# Patient Record
Sex: MALE | Race: WHITE | NOT HISPANIC OR LATINO | Employment: OTHER | URBAN - METROPOLITAN AREA
[De-identification: names, ages, dates, MRNs, and addresses within clinical notes are randomized per-mention and may not be internally consistent; named-entity substitution may affect disease eponyms.]

---

## 2017-03-06 ENCOUNTER — HOSPITAL ENCOUNTER (OUTPATIENT)
Dept: ULTRASOUND IMAGING | Facility: CLINIC | Age: 72
End: 2017-03-06
Attending: RADIOLOGY
Payer: MEDICARE

## 2017-03-06 ENCOUNTER — HOSPITAL ENCOUNTER (OUTPATIENT)
Dept: ULTRASOUND IMAGING | Facility: CLINIC | Age: 72
Discharge: HOME | End: 2017-03-06
Attending: RADIOLOGY | Admitting: RADIOLOGY
Payer: MEDICARE

## 2017-03-06 ENCOUNTER — OFFICE VISIT (OUTPATIENT)
Dept: OTHER | Facility: CLINIC | Age: 72
End: 2017-03-06
Attending: RADIOLOGY
Payer: MEDICARE

## 2017-03-06 VITALS — HEART RATE: 78 BPM | SYSTOLIC BLOOD PRESSURE: 150 MMHG | DIASTOLIC BLOOD PRESSURE: 83 MMHG

## 2017-03-06 DIAGNOSIS — I70.201 ATHEROSCLEROSIS OF RIGHT LEG (H): ICD-10-CM

## 2017-03-06 DIAGNOSIS — I73.9 PAD (PERIPHERAL ARTERY DISEASE) (H): Primary | ICD-10-CM

## 2017-03-06 PROCEDURE — 99211 OFF/OP EST MAY X REQ PHY/QHP: CPT

## 2017-03-06 PROCEDURE — 93924 LWR XTR VASC STDY BILAT: CPT

## 2017-03-06 PROCEDURE — 93926 LOWER EXTREMITY STUDY: CPT | Mod: RT

## 2017-03-06 NOTE — MR AVS SNAPSHOT
"              After Visit Summary   3/6/2017    Alberto Dorsey    MRN: [id number]           Patient Information     Date Of Birth          1945        Visit Information        Provider Department      3/6/2017 3:30 PM Manuel Mondragon MD [facility]        Today's Diagnoses     PAD (peripheral artery disease) (H)    -  1      Care Instructions    Follow up in 6 mo with repeat imaging.  Notify us sooner if worsening in symptoms or concerns.  [facility] will call to schedule        Follow-ups after your visit        Follow-up notes from your care team     Return in about 6 months (around 9/6/2017).      Who to contact     If you have questions or need follow up information about today's clinic visit or your schedule please contact [facility] directly at [phone].  Normal or non-critical lab and imaging results will be communicated to you by MyChart, letter or phone within 4 business days after the clinic has received the results. If you do not hear from us within 7 days, please contact the clinic through MyChart or phone. If you have a critical or abnormal lab result, we will notify you by phone as soon as possible.  Submit refill requests through [employer] or call your pharmacy and they will forward the refill request to us. Please allow 3 business days for your refill to be completed.          Additional Information About Your Visit        MyChart Information     [employer] lets you send messages to your doctor, view your test results, renew your prescriptions, schedule appointments and more. To sign up, go to [email or web address] . Click on \"Log in\" on the left side of the screen, which will take you to the Welcome page. Then click on \"Sign up Now\" on the right side of the page.     You will be asked to enter the access code listed below, as well as some personal information. Please follow the directions to create your username and password.     Your access code " is: [email or web address]  Expires: 2017  2:20 PM     Your access code will  in 90 days. If you need help or a new code, please call your [place] clinic or [phone].        Care EveryWhere ID     This is your Care EveryWhere ID. This could be used by other organizations to access your [place] medical records  [id number]        Your Vitals Were     Pulse                   78            Blood Pressure from Last 3 Encounters:   17 150/83   10/28/15 140/80   10/12/15 141/73    Weight from Last 3 Encounters:   16 170 lb (77.1 kg)   10/28/15 187 lb 12.8 oz (85.2 kg)               Primary Care Provider Office Phone # Fax #    Nancy Shipley [phone]       [facility]  [address]  [facility] [address]        Thank you!     Thank you for choosing [facility] [place]  for your care. Our goal is always to provide you with excellent care. Hearing back from our patients is one way we can continue to improve our services. Please take a few minutes to complete the written survey that you may receive in the mail after your visit with us. Thank you!             Your Updated Medication List - Protect others around you: Learn how to safely use, store and throw away your medicines at www.disposemymeds.org.          This list is accurate as of: 3/6/17 11:59 PM.  Always use your most recent med list.                   Brand Name Dispense Instructions for use    AMLODIPINE BESYLATE PO      Take 10 mg by mouth daily       aspirin 81 MG tablet      Take 81 mg by mouth daily       HYDRALAZINE HCL PO      Take 25 mg by mouth 2 times daily       lisinopril-hydrochlorothiazide 20-25 MG per tablet    PRINZIDE/ZESTORETIC     Take 1 tablet by mouth daily       triamcinolone 0.1 % cream    KENALOG     Apply topically 2 times daily

## 2017-03-06 NOTE — NURSING NOTE
"Chief Complaint   Patient presents with     RECHECK     R pop & TONYA w/ exercise (2:15 V[facility]; 3:30 FS) History of right NATALYA angioplasty with stenting & right popliteal artery angioplasty; 7/27/2007 follow up to 10/12/2015 appointment with Dr Mondragon        Initial /83 (BP Location: Right arm, Patient Position: Chair, Cuff Size: Adult Regular)  Pulse 78 Estimated body mass index is 22.43 kg/(m^2) as calculated from the following:    Height as of 3/23/16: 6' 1\" (1.854 m).    Weight as of 3/23/16: 170 lb (77.1 kg).  Medication Reconciliation: complete     Face to face nursing time: 8 minutes    Georgette Vickers MA     "

## 2017-03-07 NOTE — PROGRESS NOTES
March 6, 2017         Nancy Shipley MD   [facility]   [address]   [place] [address]   [facility]       Dear Dr. Shipley:      I saw Alberto Dorsey at the [facility] today.  He is a 71-year-old man with lower extremity claudication symptoms.  He underwent a right common iliac artery stenting and right popliteal artery angioplasty in 2007 with Dr. Mackey.  Since then, the patient has done well.  He denies any significant residual claudication symptoms.  He is able to walk without any problems and perform his activities of daily living without problems.  The patient continues to smoke a few cigarettes a day.  He notices an occasional, what he describes as a twitch in the right calf over the course of the last 1 year.      Repeat noninvasive imaging studies were performed.      The right common iliac artery stent is patent without significant stenosis.  The right popliteal artery is also patent without significant stenosis.      Repeat resting and exercise ABIs were performed.  The resting and exercise ABIs on the right are 0.96 and 0.76 respectively versus 0.93 and 0.91 on the prior exam.      The resting and exercise ABIs on the left are 1.00 and 0.74 respectively versus 0.92 and 0.88 on the prior exam.      PHYSICAL EXAMINATION:  The patient's feet do not show signs of acute ischemia.  There are no ulcerations.  Foot pulses are palpable bilaterally.      IMPRESSION AND PLAN:  Lower extremity claudication symptoms status post right common iliac artery stenting and right popliteal artery angioplasty in 2007.  Doing well.  Post-exercise ABIs on today's exam on today's studies are slightly lower than noted on the previous exam.  However, ultrasound of the stents and right popliteal artery is unremarkable.  We will obtain followup ultrasounds in and repeat ABIs in 6 months to monitor for any developing stenoses.      Thank you for  allowing me to participate in the management of this patient.      Total time spent discussing cares was 10 minutes.      Sincerely,         JEFFERY ULLOA MD             D: 2017 16:00   T: 2017 07:08   MT: [email or web address]      Name:     DINO LIMA   MRN:      [id number]        Account:      [id number]   :      1945           Visit Date:   2017      Document: [id number]       cc: Nancy Shipley MD

## 2017-03-08 NOTE — PATIENT INSTRUCTIONS
Follow up in 6 mo with repeat imaging.  Notify us sooner if worsening in symptoms or concerns.  [facility] will call to schedule

## 2018-04-16 ENCOUNTER — HOSPITAL ENCOUNTER (OUTPATIENT)
Dept: ULTRASOUND IMAGING | Facility: CLINIC | Age: 73
End: 2018-04-16
Attending: RADIOLOGY
Payer: COMMERCIAL

## 2018-04-16 ENCOUNTER — OFFICE VISIT (OUTPATIENT)
Dept: OTHER | Facility: CLINIC | Age: 73
End: 2018-04-16
Attending: RADIOLOGY
Payer: COMMERCIAL

## 2018-04-16 ENCOUNTER — HOSPITAL ENCOUNTER (OUTPATIENT)
Dept: ULTRASOUND IMAGING | Facility: CLINIC | Age: 73
Discharge: HOME | End: 2018-04-16
Attending: RADIOLOGY | Admitting: RADIOLOGY
Payer: COMMERCIAL

## 2018-04-16 VITALS — HEART RATE: 80 BPM | SYSTOLIC BLOOD PRESSURE: 136 MMHG | DIASTOLIC BLOOD PRESSURE: 68 MMHG

## 2018-04-16 DIAGNOSIS — I73.9 PAD (PERIPHERAL ARTERY DISEASE) (H): Primary | ICD-10-CM

## 2018-04-16 DIAGNOSIS — I73.9 PAD (PERIPHERAL ARTERY DISEASE) (H): ICD-10-CM

## 2018-04-16 PROCEDURE — 93924 LWR XTR VASC STDY BILAT: CPT

## 2018-04-16 PROCEDURE — 93926 LOWER EXTREMITY STUDY: CPT | Mod: RT

## 2018-04-16 NOTE — PROGRESS NOTES
Dear Dr. Shipley:       I saw Alberto Dorsey at the [facility] today.  He is a 72-year-old man with lower extremity claudication symptoms.  He underwent a right common iliac artery stenting and right popliteal artery angioplasty in 2007 with Dr. Mackey. Since then, the patient has done well.  He denies any significant residual claudication symptoms.  He is able to walk without any problems and perform his activities of daily living without problems.  The patient continues to smoke 4-5  few cigarettes a day.  He has noticed some increased sensitivity in the toes of the right foot for the past 11 years. He has some pain attributable to a cut toenail in the left foot.        Repeat noninvasive imaging studies were performed.       The right common iliac artery stent is patent with a mild stenosis at mid aspect. The right popliteal artery is also patent without significant stenosis.       Repeat resting and exercise ABIs were performed.  The resting and exercise ABIs on the right are 1.10 and 0.81 vs 0.96 and 0.76  on the prior exam.       The resting and exercise ABIs on the left are 0.92 and 0.66 vs 1.00 and 0.74  on the prior exam.       PHYSICAL EXAMINATION:  The patient's feet do not show signs of acute ischemia.  There are no ulcerations.  Foot pulses are palpable bilaterally. VSS.      IMPRESSION AND PLAN:  Lower extremity claudication symptoms status post right common iliac artery stenting and right popliteal artery angioplasty in 2007.  Doing well. Post-exercise ABIs on today's exam are show moderate arterial insufficiency which is stable.  We will obtain followup ultrasounds in and repeat ABIs in 1 year monitor for any developing stenoses.       Thank you for allowing me to participate in the management of this patient.       Total time spent discussing cares was 10 minutes.

## 2018-04-16 NOTE — PATIENT INSTRUCTIONS
[facility]    1) Please follow up in a year for an Ultrasound/TONYA of your legs and clinic visit with Dr Mondragon.     2) You will get a call or letter from the clinic to help you schedule this appointment.     Please call Eusebia Mondragon's nurse with any concerns or questions at

## 2018-04-16 NOTE — NURSING NOTE
"No chief complaint on file.      Initial /68 (BP Location: Left arm, Patient Position: Chair, Cuff Size: Adult Regular)  Pulse 80 Estimated body mass index is 22.43 kg/(m^2) as calculated from the following:    Height as of 3/23/16: 6' 1\" (1.854 m).    Weight as of 3/23/16: 170 lb (77.1 kg).  Medication Reconciliation: sofia Lockett CMA on 4/16/2018 at 1:33 PM    "

## 2018-08-01 DIAGNOSIS — I73.9 PAD (PERIPHERAL ARTERY DISEASE) (H): Primary | ICD-10-CM

## 2018-10-02 ENCOUNTER — TELEPHONE (OUTPATIENT)
Dept: OTHER | Facility: CLINIC | Age: 73
End: 2018-10-02

## 2018-10-02 NOTE — TELEPHONE ENCOUNTER
Patient called and states he has bilateral calf( right greater than left )claudication for approximately 1 month.  No rest pain.  He is on ASA only.  Smoker.   H/o History right NATALYA PTA with stenting and right pop artery  angioplasty done on 7/27/2017 comparison study 3/6/2017. Six month  followup with Dr. Mondragon. PAD (peripheral artery disease) (H)    Offered an appointment for this Thursday at [facility] speciality clinic. Patient declined.   Will obtain TONYA with exercise and duplex with the right NATALYA stent and right popliteal artery in the near future.     Will route to IR  to contact Alberto today or tomorrow and schedule exams.  Will review with radiologist and call patient with results.    Eusebia Alves RN  IR nurse clinician  [phone]

## 2018-10-04 NOTE — TELEPHONE ENCOUNTER
Alberto is scheduled at [facility] 10/5/18 2:10pm for BLE art US and TONYA w/ex.  Returning to Eusebia Alves to review results. .celina

## 2018-10-05 ENCOUNTER — HOSPITAL ENCOUNTER (OUTPATIENT)
Dept: ULTRASOUND IMAGING | Facility: CLINIC | Age: 73
Discharge: HOME | End: 2018-10-05
Attending: RADIOLOGY | Admitting: RADIOLOGY
Payer: COMMERCIAL

## 2018-10-05 ENCOUNTER — HOSPITAL ENCOUNTER (OUTPATIENT)
Dept: ULTRASOUND IMAGING | Facility: CLINIC | Age: 73
End: 2018-10-05
Attending: RADIOLOGY
Payer: COMMERCIAL

## 2018-10-05 DIAGNOSIS — I70.213 ATHEROSCLER OF NATIVE ARTERY OF BOTH LEGS WITH INTERMIT CLAUDICATION (H): ICD-10-CM

## 2018-10-05 DIAGNOSIS — I73.9 PAD (PERIPHERAL ARTERY DISEASE) (H): ICD-10-CM

## 2018-10-05 PROCEDURE — 93926 LOWER EXTREMITY STUDY: CPT | Mod: RT

## 2018-10-05 PROCEDURE — 93924 LWR XTR VASC STDY BILAT: CPT

## 2018-10-08 ENCOUNTER — TELEPHONE (OUTPATIENT)
Dept: OTHER | Facility: CLINIC | Age: 73
End: 2018-10-08

## 2018-10-08 NOTE — TELEPHONE ENCOUNTER
Spoke with Alberto.  Discussed results of the ultrasound done on 10/5.  Symptoms of bilateral leg claudication right greater than left is unlikely due to arterial disease. Slight decrease in left indices but not enough to cause the symptoms that Alberto is concerned about.  Etiology unknown? Recommend follow up with primary physician.  Recommend follow up in 6 months.Instructed to contact us sooner if questions or concerns.   Eusebia Alves RN  IR nurse clinician  [phone]  RIGHT LOWER EXTREMITY ARTERIAL DUPLEX ULTRASOUND  10/5/2018  3:13 PM     HISTORY: 73-year-old patient with bilateral calf pain and tightness  while walking. Patient has tingling in the calf, as well.     COMPARISON: April 16, 2018     TECHNIQUE: Color Doppler and spectral waveform analysis obtained in  the right common iliac artery and popliteal artery.     FINDINGS: The right common iliac artery and stent are patent.  Borderline velocity elevation measuring up to 220 cm/second in the  outflow segment. The popliteal artery is patent without velocity  elevation. Diffuse atherosclerotic plaque, though no visible stenosis.  Triphasic waveforms and popliteal and tibial peroneal trunk segments.         IMPRESSION: Patent right common iliac artery and popliteal artery  without evidence of occlusion or stenosis.     LAURIE MTZ MD  ULTRASOUND ANKLE-BRACHIAL INDEX DOPPLER WITH EXERCISE BILATERAL  10/5/2018 3:25 PM     HISTORY:  73-year-old patient with bilateral calf pain while walking.  Numbness and tingling in both calves. Patient had a right common iliac  arterial stent and right popliteal angioplasty on July 27, 2007.     COMPARISON: April 16, 2018.     FINDINGS: Resting TONYA in the right lower extremity 0.98, previously  1.1. Resting TONYA in the left lower extremity 0.74, previously 0.92.  Distal waveforms in the right lower extremity are triphasic and  biphasic in the left lower extremity. Patient exercised on a treadmill  for 5 minutes at 10%  incline and 1.5 miles per hour. Patient noted  bilateral calf tightness. Post exercise TONYA values are 0.96 on the  right, previously 0.81; 0.62 on the left, previously 0.66.         IMPRESSION:  1. Normal TONYA examination in the right lower extremity.  2. Mild arterial insufficiency in the left lower extremity, slightly  worsened since previous exam.

## 2018-11-13 DIAGNOSIS — I73.9 PAD (PERIPHERAL ARTERY DISEASE) (H): Primary | ICD-10-CM

## 2019-06-04 ENCOUNTER — HOSPITAL ENCOUNTER (OUTPATIENT)
Dept: ULTRASOUND IMAGING | Facility: CLINIC | Age: 74
Discharge: HOME | End: 2019-06-04
Attending: RADIOLOGY | Admitting: RADIOLOGY
Payer: COMMERCIAL

## 2019-06-04 ENCOUNTER — HOSPITAL ENCOUNTER (OUTPATIENT)
Dept: ULTRASOUND IMAGING | Facility: CLINIC | Age: 74
End: 2019-06-04
Attending: RADIOLOGY
Payer: COMMERCIAL

## 2019-06-04 DIAGNOSIS — I73.9 PAD (PERIPHERAL ARTERY DISEASE) (H): ICD-10-CM

## 2019-06-04 PROCEDURE — 93924 LWR XTR VASC STDY BILAT: CPT

## 2019-06-04 PROCEDURE — 93926 LOWER EXTREMITY STUDY: CPT | Mod: RT

## 2019-06-06 ENCOUNTER — TELEPHONE (OUTPATIENT)
Dept: OTHER | Facility: CLINIC | Age: 74
End: 2019-06-06

## 2019-06-06 NOTE — TELEPHONE ENCOUNTER
Spoke with patient, he is had left leg claudication for a few months.  Reviewed TONYA's recommend consult with Dr. Almaraz on Monday.  Patient has seen Dr. Mondragon in the past.  Is fine seeing Dr. Almaraz on Monday.   Patient agreed to plan.  Eusebia Alves RN  IR nurse clinician  [phone]  ULTRASOUND TONYA DOPPLER WITH EXERCISE BILATERAL June 4, 2019 10:45 AM      HISTORY: History right NATALYA PTA with stenting and right pop artery. Six  month followup. PAD (peripheral artery disease) (H).     COMPARISON: 10/15/2018. 10/8/2018.     FINDINGS:  Right TONYA: 0.88, previously 0.98.  Left TONYA: 0.51, previously 0.74.     Waveforms: Triphasic on the right. Monophasic on the left.     Exercise: Patient exercised on a treadmill for 5 minutes at 1.5 miles  prior at a 10% incline. Patient complained of bilateral calf  tightening at 2 minutes and 30 seconds and left calf increasing  tightness at 3 minutes and 15 seconds.     Right exercise TONYA: 0.91, previously 0.96.  Left exercise TONYA: 0.14, previously 0.62.                                                                      IMPRESSION:   1. Right TONYA shows moderate arterial insufficiency which becomes mild  with exercise. Resting and exercise ABIs are worse when compared to  the prior study.  2. Left TONYA shows moderate arterial insufficiency which becomes  critical with exercise. Resting and exercise ABIs are worse when  compared to the prior study.     TONYA CRITERIA:  >0.95 Normal  0.90 - 0.94 Mild  0.5 - 0.89 Moderate  0.2 - 0.49 Severe  <0.2 Critical     MICHELLE ALMARAZ DO

## 2019-06-10 ENCOUNTER — OFFICE VISIT (OUTPATIENT)
Dept: OTHER | Facility: CLINIC | Age: 74
End: 2019-06-10
Attending: RADIOLOGY
Payer: COMMERCIAL

## 2019-06-10 VITALS — HEART RATE: 80 BPM | SYSTOLIC BLOOD PRESSURE: 135 MMHG | DIASTOLIC BLOOD PRESSURE: 67 MMHG

## 2019-06-10 DIAGNOSIS — R09.89 BILATERAL CAROTID BRUITS: Primary | ICD-10-CM

## 2019-06-10 DIAGNOSIS — I73.9 PAD (PERIPHERAL ARTERY DISEASE) (H): Primary | ICD-10-CM

## 2019-06-10 DIAGNOSIS — I70.213 ATHEROSCLEROSIS OF NATIVE ARTERIES OF EXTREMITIES WITH INTERMITTENT CLAUDICATION, BILATERAL LEGS (H): ICD-10-CM

## 2019-06-10 NOTE — NURSING NOTE
"Alberto Dorsey is a 73 year old male who presents for:  Chief Complaint   Patient presents with     Consult     claudication, left leg, TONYA's done        Vitals:    Vitals:    06/10/19 1405 06/10/19 1406   BP: 144/60 135/67   BP Location: Left arm Right arm   Patient Position: Chair Chair   Cuff Size: Adult Regular Adult Regular   Pulse: 83 80       BMI:  Estimated body mass index is 22.43 kg/m  as calculated from the following:    Height as of 3/23/16: 6' 1\" (1.854 m).    Weight as of 3/23/16: 170 lb (77.1 kg).    Pain Score:  Data Unavailable        Kecia Perez MA     "

## 2019-06-11 NOTE — PATIENT INSTRUCTIONS
Schedule pre-op for upcoming angiogram  Schedule bilateral leg angiogram  Schedule bilateral carotid

## 2019-06-11 NOTE — PROGRESS NOTES
Patient Name: Alberto Dorsey  Patient MRN: [id number]  Patient : 1945    HPI: This is a 73 year old male who presents today for bilateral leg claudication left worse than right.  He does have a history of right common iliac artery stent and right popliteal artery angioplasty done in  with Dr. Mackey.  He has had ongoing symptoms since  in both legs.  However, they have been atypical in its presentation for arterial insufficiency.   Back in 2018, he complained of calf pain and burning sensation both legs that actually improved when he lied down.  His TONYA's at that time were normal on the right with mild arterial insufficiency in the left.  Due to his ongoing issues, I recommended that we repeat the study in 6 months. But in the meantime follow up with his primary.      He states, it has progressively has worsened.  He is also noted cyanosis in his left great toe recently.  It appears that the left toe is also showing signs of cyanosis as well.  He is claudicating at half a block with calf pain into the ankle.  Left is definitely worse than the right.  He denies rest pain.  He still continues to smoke.     OBJECTIVE:   /67 (BP Location: Right arm, Patient Position: Chair, Cuff Size: Adult Regular)   Pulse 80   +bilateral carotid bruit.  Right greater than left.     General Appearance: WDWN male in NAD  Lower Extremity: No swelling.  Cyanosis noted in both great toes.  Left greater than right.  Pulses:  Absent left femoral pulse  2+ right femoral pulse  Absent left dorsal pedis  Doppler posterior tibial  Doppler right dorsal pedis  Doppler Posterior tibial      Imaging:   Results for orders placed or performed during the hospital encounter of 19   US TONYA Doppler with Exercise    Narrative    ULTRASOUND TONYA DOPPLER WITH EXERCISE BILATERAL 2019 10:45 AM     HISTORY: History right NATALYA PTA with stenting and right pop artery. Six  month followup. PAD (peripheral artery disease)  (H).    COMPARISON: 10/15/2018. 10/8/2018.    FINDINGS:  Right TONYA: 0.88, previously 0.98.  Left TONYA: 0.51, previously 0.74.    Waveforms: Triphasic on the right. Monophasic on the left.    Exercise: Patient exercised on a treadmill for 5 minutes at 1.5 miles  prior at a 10% incline. Patient complained of bilateral calf  tightening at 2 minutes and 30 seconds and left calf increasing  tightness at 3 minutes and 15 seconds.    Right exercise TONYA: 0.91, previously 0.96.  Left exercise TONYA: 0.14, previously 0.62.      Impression    IMPRESSION:   1. Right TONYA shows moderate arterial insufficiency which becomes mild  with exercise. Resting and exercise ABIs are worse when compared to  the prior study.  2. Left TONYA shows moderate arterial insufficiency which becomes  critical with exercise. Resting and exercise ABIs are worse when  compared to the prior study.    TONYA CRITERIA:  >0.95 Normal  0.90 - 0.94 Mild  0.5 - 0.89 Moderate  0.2 - 0.49 Severe  <0.2 Critical    MICHELLE RODRIGUEZ,          Assessment/Plan:    This is a pleasant 73 year old male with bilateral life limiting claudication with left being worse than the right.  He recently has noticed cyanosis in the left toe, and it appears that it may be starting in the right toe as well.  He is claudicating at around half a block and at this point it is quite limiting.  He is using a walker to ambulate.     We discussed his TONYA and right arterial duplex that was performed on 6/4/2019.  His right common iliac stent and popliteal artery Is patent.  However, there are elevated velocities within the stent. This is unchanged from previous study.  His TONYA's study shows moderate arterial insufficiency which becomes critical with exercise in his left leg.  The right TONYA show moderate arterial insufficiency which becomes mild with exercise.  Both resting and exercise ABIs are worse when compared to the prior study.    Recommendation:  I recommended to the patient that I  perform a bilateral leg angiogram with emphasis on the left leg first.  If intervention needs to be done we may have to bring him back a second time to study the right leg.  I also recommended that if have a bilateral carotid ultrasound as well before we do his leg angiogram.    He is agreed to the plan and would like it performed as soon as possible.         I met with the patient for 30 minutes of which >50% of the time was used to discuss treatment options and/or coordination of care.    Thank you for the consult. We will continue to monitor this patient for their vascular needs.    Dr. Marcelle Almaraz DO  Pager: [phone]  Interventional Radiology   [facility]  [phone]

## 2019-06-12 ENCOUNTER — HOSPITAL ENCOUNTER (OUTPATIENT)
Dept: ULTRASOUND IMAGING | Facility: CLINIC | Age: 74
Discharge: HOME | End: 2019-06-12
Attending: RADIOLOGY | Admitting: RADIOLOGY
Payer: COMMERCIAL

## 2019-06-12 ENCOUNTER — TELEPHONE (OUTPATIENT)
Dept: OTHER | Facility: CLINIC | Age: 74
End: 2019-06-12

## 2019-06-12 DIAGNOSIS — R09.89 BILATERAL CAROTID BRUITS: ICD-10-CM

## 2019-06-12 PROCEDURE — 93880 EXTRACRANIAL BILAT STUDY: CPT

## 2019-06-12 NOTE — TELEPHONE ENCOUNTER
Called patient with results.  He is having his pre-op on Friday.  Recommend annual f/u carotid ultrasound.  Eusebia Alves RN  IR nurse clinician  [phone]  US CAROTID BILATERAL 6/12/2019 9:29 AM     HISTORY: Bilateral carotid bruits     COMPARISON: None     FINDINGS:      Right side:   On the grayscale images, mixed plaque is identified at the carotid  bifurcation extending into the internal and external carotid arteries.  Spectral waveform analysis was performed. Peak systolic and diastolic  velocities in the right internal carotid artery are 109 and 15 cm/s.  Per sonographic criteria, degree of stenosis in the right internal  carotid artery is less than 50%.  Flow in the right vertebral artery is not adequately visualized.     Left side:   On the grayscale images, mixed plaque is identified at the carotid  bifurcation extending into the internal and external carotid arteries.  Spectral waveform analysis was performed. Peak systolic and diastolic   velocities in the left internal carotid artery are 131 and 24 cm/s.  Per sonographic criteria, degree of stenosis in the left internal  carotid artery is 50-69%.  Flow in the left vertebral artery is antegrade.                                                                       IMPRESSION: Per sonographic criteria, there is a less than 50%  stenosis in the right internal carotid artery and a 50-69% stenosis in  the left internal carotid artery. Unable to adequately visualize flow  in the right vertebral artery.     Degree of stenosis measured relative to the diameter of the normal  internal carotid artery per NASCET or NASCET type criteria     JEFFERY ULLOA MD

## 2019-06-12 NOTE — TELEPHONE ENCOUNTER
Carotid ultrasound with negative for stenosis.  Discussed with Dr. Macedo regarding carotid bruit.  I am concerned that it may be cardiovascular related.  Last ECHO was in 2010.    We will likely proceed with angiogram next week.  Patient has pre-op on Friday.    When patient returns for 1 mo f/u with Dr. Almaraz, I will recommend to the patient to see Dr. Macedo as well.  Current smoker.  Eusebia Alves RN  IR nurse clinician  [phone]

## 2019-06-19 ENCOUNTER — APPOINTMENT (OUTPATIENT)
Dept: INTERVENTIONAL RADIOLOGY/VASCULAR | Facility: CLINIC | Age: 74
End: 2019-06-19
Attending: RADIOLOGY
Payer: COMMERCIAL

## 2019-06-19 ENCOUNTER — HOSPITAL ENCOUNTER (OUTPATIENT)
Facility: CLINIC | Age: 74
Discharge: HOME | End: 2019-06-19
Attending: RADIOLOGY | Admitting: RADIOLOGY
Payer: COMMERCIAL

## 2019-06-19 VITALS
TEMPERATURE: 96.2 F | RESPIRATION RATE: 18 BRPM | OXYGEN SATURATION: 95 % | SYSTOLIC BLOOD PRESSURE: 138 MMHG | HEART RATE: 82 BPM | HEIGHT: 73 IN | BODY MASS INDEX: 22.66 KG/M2 | WEIGHT: 171 LBS | DIASTOLIC BLOOD PRESSURE: 73 MMHG

## 2019-06-19 DIAGNOSIS — I70.212 ATHEROSCLEROSIS OF NATIVE ARTERY OF LEFT LOWER EXTREMITY WITH INTERMITTENT CLAUDICATION (H): ICD-10-CM

## 2019-06-19 DIAGNOSIS — Z72.0 TOBACCO ABUSE: ICD-10-CM

## 2019-06-19 DIAGNOSIS — E78.5 HYPERLIPIDEMIA LDL GOAL <70: Primary | ICD-10-CM

## 2019-06-19 DIAGNOSIS — I70.213 ATHEROSCLEROSIS OF NATIVE ARTERIES OF EXTREMITIES WITH INTERMITTENT CLAUDICATION, BILATERAL LEGS (H): ICD-10-CM

## 2019-06-19 LAB
APTT PPP: 30 SEC (ref 22–37)
CHOLEST SERPL-MCNC: 154 MG/DL
CREAT SERPL-MCNC: 0.96 MG/DL (ref 0.66–1.25)
ERYTHROCYTE [DISTWIDTH] IN BLOOD BY AUTOMATED COUNT: 13.2 % (ref 10–15)
GFR SERPL CREATININE-BSD FRML MDRD: 79 ML/MIN/{1.73_M2}
HBA1C MFR BLD: 5.4 % (ref 0–5.6)
HCT VFR BLD AUTO: 34.9 % (ref 40–53)
HDLC SERPL-MCNC: 46 MG/DL
HGB BLD-MCNC: 12.6 G/DL (ref 13.3–17.7)
INR PPP: 0.9 (ref 0.86–1.14)
LDLC SERPL CALC-MCNC: 58 MG/DL
MCH RBC QN AUTO: 32.9 PG (ref 26.5–33)
MCHC RBC AUTO-ENTMCNC: 36.1 G/DL (ref 31.5–36.5)
MCV RBC AUTO: 91 FL (ref 78–100)
NONHDLC SERPL-MCNC: 108 MG/DL
PLATELET # BLD AUTO: 288 10E9/L (ref 150–450)
RBC # BLD AUTO: 3.83 10E12/L (ref 4.4–5.9)
TRIGL SERPL-MCNC: 250 MG/DL
WBC # BLD AUTO: 6.8 10E9/L (ref 4–11)

## 2019-06-19 PROCEDURE — 85027 COMPLETE CBC AUTOMATED: CPT | Performed by: RADIOLOGY

## 2019-06-19 PROCEDURE — 27210805 ZZH SHEATH CR4

## 2019-06-19 PROCEDURE — 37221 ZZHC REVASC ILIAC ART, ANGIO/STENT, INIT VESSEL: CPT

## 2019-06-19 PROCEDURE — 83036 HEMOGLOBIN GLYCOSYLATED A1C: CPT | Performed by: RADIOLOGY

## 2019-06-19 PROCEDURE — 27210894 ZZH CATH CR6

## 2019-06-19 PROCEDURE — 36415 COLL VENOUS BLD VENIPUNCTURE: CPT

## 2019-06-19 PROCEDURE — 27210742 ZZH CATH CR1

## 2019-06-19 PROCEDURE — 25000132 ZZH RX MED GY IP 250 OP 250 PS 637: Performed by: NURSE PRACTITIONER

## 2019-06-19 PROCEDURE — 85730 THROMBOPLASTIN TIME PARTIAL: CPT | Performed by: RADIOLOGY

## 2019-06-19 PROCEDURE — 85610 PROTHROMBIN TIME: CPT | Performed by: RADIOLOGY

## 2019-06-19 PROCEDURE — 27210906 ZZH KIT CR8

## 2019-06-19 PROCEDURE — 25000128 H RX IP 250 OP 636

## 2019-06-19 PROCEDURE — C1769 GUIDE WIRE: HCPCS

## 2019-06-19 PROCEDURE — 80061 LIPID PANEL: CPT | Performed by: RADIOLOGY

## 2019-06-19 PROCEDURE — 25500064 ZZH RX 255 OP 636: Performed by: RADIOLOGY

## 2019-06-19 PROCEDURE — 25000125 ZZHC RX 250

## 2019-06-19 PROCEDURE — 36140 INTRO NDL ICATH UPR/LXTR ART: CPT | Mod: XU

## 2019-06-19 PROCEDURE — 27210804 ZZH SHEATH CR3

## 2019-06-19 PROCEDURE — 82565 ASSAY OF CREATININE: CPT | Performed by: RADIOLOGY

## 2019-06-19 PROCEDURE — 40000853 ZZH STATISTIC ANGIOGRAM, STENT, VERTEBRO PLASTY

## 2019-06-19 PROCEDURE — 25800030 ZZH RX IP 258 OP 636: Performed by: RADIOLOGY

## 2019-06-19 PROCEDURE — 27210845 ZZH DEVICE INFLATION CR5

## 2019-06-19 PROCEDURE — 25000128 H RX IP 250 OP 636: Performed by: RADIOLOGY

## 2019-06-19 PROCEDURE — 99204 OFFICE O/P NEW MOD 45 MIN: CPT | Performed by: INTERNAL MEDICINE

## 2019-06-19 PROCEDURE — C1876 STENT, NON-COA/NON-COV W/DEL: HCPCS

## 2019-06-19 PROCEDURE — 27210886 ZZH ACCESSORY CR5

## 2019-06-19 RX ORDER — FLUMAZENIL 0.1 MG/ML
0.2 INJECTION, SOLUTION INTRAVENOUS
Status: DISCONTINUED | OUTPATIENT
Start: 2019-06-19 | End: 2019-06-19

## 2019-06-19 RX ORDER — DEXTROSE MONOHYDRATE 25 G/50ML
25-50 INJECTION, SOLUTION INTRAVENOUS
Status: DISCONTINUED | OUTPATIENT
Start: 2019-06-19 | End: 2019-06-19

## 2019-06-19 RX ORDER — LIDOCAINE HYDROCHLORIDE 10 MG/ML
INJECTION, SOLUTION INFILTRATION; PERINEURAL
Status: COMPLETED
Start: 2019-06-19 | End: 2019-06-19

## 2019-06-19 RX ORDER — FENTANYL CITRATE 50 UG/ML
INJECTION, SOLUTION INTRAMUSCULAR; INTRAVENOUS
Status: COMPLETED
Start: 2019-06-19 | End: 2019-06-19

## 2019-06-19 RX ORDER — ATORVASTATIN CALCIUM 40 MG/1
40 TABLET, FILM COATED ORAL AT BEDTIME
Qty: 90 TABLET | Refills: 3 | Status: ON HOLD | OUTPATIENT
Start: 2019-06-19 | End: 2019-11-06

## 2019-06-19 RX ORDER — SODIUM CHLORIDE 9 MG/ML
INJECTION, SOLUTION INTRAVENOUS CONTINUOUS
Status: DISCONTINUED | OUTPATIENT
Start: 2019-06-19 | End: 2019-06-20 | Stop reason: HOSPADM

## 2019-06-19 RX ORDER — HEPARIN SODIUM 1000 [USP'U]/ML
500-6000 INJECTION, SOLUTION INTRAVENOUS; SUBCUTANEOUS
Status: DISCONTINUED | OUTPATIENT
Start: 2019-06-19 | End: 2019-06-19

## 2019-06-19 RX ORDER — FENTANYL CITRATE 50 UG/ML
25-50 INJECTION, SOLUTION INTRAMUSCULAR; INTRAVENOUS EVERY 5 MIN PRN
Status: DISCONTINUED | OUTPATIENT
Start: 2019-06-19 | End: 2019-06-19

## 2019-06-19 RX ORDER — HEPARIN SODIUM 200 [USP'U]/100ML
500 INJECTION, SOLUTION INTRAVENOUS CONTINUOUS PRN
Status: DISCONTINUED | OUTPATIENT
Start: 2019-06-19 | End: 2019-06-19

## 2019-06-19 RX ORDER — LIDOCAINE 40 MG/G
CREAM TOPICAL
Status: DISCONTINUED | OUTPATIENT
Start: 2019-06-19 | End: 2019-06-19

## 2019-06-19 RX ORDER — NALOXONE HYDROCHLORIDE 0.4 MG/ML
.1-.4 INJECTION, SOLUTION INTRAMUSCULAR; INTRAVENOUS; SUBCUTANEOUS
Status: DISCONTINUED | OUTPATIENT
Start: 2019-06-19 | End: 2019-06-19

## 2019-06-19 RX ORDER — NICOTINE POLACRILEX 4 MG
15-30 LOZENGE BUCCAL
Status: DISCONTINUED | OUTPATIENT
Start: 2019-06-19 | End: 2019-06-19

## 2019-06-19 RX ORDER — IODIXANOL 320 MG/ML
150 INJECTION, SOLUTION INTRAVASCULAR ONCE
Status: COMPLETED | OUTPATIENT
Start: 2019-06-19 | End: 2019-06-19

## 2019-06-19 RX ORDER — SODIUM CHLORIDE 9 MG/ML
INJECTION, SOLUTION INTRAVENOUS CONTINUOUS
Status: DISCONTINUED | OUTPATIENT
Start: 2019-06-19 | End: 2019-06-19

## 2019-06-19 RX ORDER — ACETAMINOPHEN 500 MG
500 TABLET ORAL EVERY 6 HOURS PRN
Status: DISCONTINUED | OUTPATIENT
Start: 2019-06-19 | End: 2019-06-20 | Stop reason: HOSPADM

## 2019-06-19 RX ORDER — BUPROPION HYDROCHLORIDE 150 MG/1
150 TABLET, FILM COATED, EXTENDED RELEASE ORAL 2 TIMES DAILY
Qty: 60 TABLET | Refills: 3 | Status: ON HOLD | OUTPATIENT
Start: 2019-06-19 | End: 2019-11-06

## 2019-06-19 RX ADMIN — MIDAZOLAM HYDROCHLORIDE 0.5 MG: 1 INJECTION, SOLUTION INTRAMUSCULAR; INTRAVENOUS at 14:55

## 2019-06-19 RX ADMIN — MIDAZOLAM HYDROCHLORIDE 0.5 MG: 1 INJECTION, SOLUTION INTRAMUSCULAR; INTRAVENOUS at 15:02

## 2019-06-19 RX ADMIN — HEPARIN SODIUM 10000 UNITS: 10000 INJECTION INTRAVENOUS; SUBCUTANEOUS at 14:47

## 2019-06-19 RX ADMIN — MIDAZOLAM HYDROCHLORIDE 0.5 MG: 1 INJECTION, SOLUTION INTRAMUSCULAR; INTRAVENOUS at 15:43

## 2019-06-19 RX ADMIN — FENTANYL CITRATE 25 MCG: 50 INJECTION INTRAMUSCULAR; INTRAVENOUS at 15:11

## 2019-06-19 RX ADMIN — MIDAZOLAM HYDROCHLORIDE 0.5 MG: 1 INJECTION, SOLUTION INTRAMUSCULAR; INTRAVENOUS at 16:07

## 2019-06-19 RX ADMIN — FENTANYL CITRATE 25 MCG: 50 INJECTION INTRAMUSCULAR; INTRAVENOUS at 15:03

## 2019-06-19 RX ADMIN — FENTANYL CITRATE 25 MCG: 50 INJECTION INTRAMUSCULAR; INTRAVENOUS at 16:01

## 2019-06-19 RX ADMIN — FENTANYL CITRATE 25 MCG: 50 INJECTION INTRAMUSCULAR; INTRAVENOUS at 15:44

## 2019-06-19 RX ADMIN — ACETAMINOPHEN 500 MG: 500 TABLET, FILM COATED ORAL at 19:55

## 2019-06-19 RX ADMIN — MIDAZOLAM HYDROCHLORIDE 0.5 MG: 1 INJECTION, SOLUTION INTRAMUSCULAR; INTRAVENOUS at 16:20

## 2019-06-19 RX ADMIN — FENTANYL CITRATE 25 MCG: 50 INJECTION INTRAMUSCULAR; INTRAVENOUS at 16:20

## 2019-06-19 RX ADMIN — MIDAZOLAM HYDROCHLORIDE 0.5 MG: 1 INJECTION, SOLUTION INTRAMUSCULAR; INTRAVENOUS at 15:11

## 2019-06-19 RX ADMIN — MIDAZOLAM HYDROCHLORIDE 0.5 MG: 1 INJECTION, SOLUTION INTRAMUSCULAR; INTRAVENOUS at 16:01

## 2019-06-19 RX ADMIN — LIDOCAINE HYDROCHLORIDE 16 ML: 10 INJECTION, SOLUTION INFILTRATION; PERINEURAL at 15:03

## 2019-06-19 RX ADMIN — FENTANYL CITRATE 25 MCG: 50 INJECTION INTRAMUSCULAR; INTRAVENOUS at 14:56

## 2019-06-19 RX ADMIN — SODIUM CHLORIDE: 9 INJECTION, SOLUTION INTRAVENOUS at 10:45

## 2019-06-19 RX ADMIN — MIDAZOLAM HYDROCHLORIDE 0.5 MG: 1 INJECTION, SOLUTION INTRAMUSCULAR; INTRAVENOUS at 15:48

## 2019-06-19 RX ADMIN — FENTANYL CITRATE 25 MCG: 50 INJECTION INTRAMUSCULAR; INTRAVENOUS at 16:07

## 2019-06-19 RX ADMIN — FENTANYL CITRATE 25 MCG: 50 INJECTION INTRAMUSCULAR; INTRAVENOUS at 15:48

## 2019-06-19 RX ADMIN — IODIXANOL 55 ML: 320 INJECTION, SOLUTION INTRAVASCULAR at 16:23

## 2019-06-19 ASSESSMENT — MIFFLIN-ST. JEOR: SCORE: 1584.53

## 2019-06-19 NOTE — CONSULTS
[facility]    Vascular Medicine Consultation     Date of Admission:  6/19/2019  Date of Consult (When I saw the patient): 06/19/19         Physician Supervisory Attestation:   I have reviewed and discussed with the physician assistant their history, physical and plan and independently interviewed and examined Alberto Dorsey and agree with the plan as stated in the physician assistant note.      Alberto Dorsey is a 73 year old male smoker with a history of hyperlipidemia, hypertension, DONNA, and PAD who underwent stenting of his right common iliac artery and angioplasty of his right popliteal artery in 2007 by Dr. Mackey. He was followed on an annual basis by Interventional Radiology and had been doing well up until the past few months where he began to develop progressive worsening bilateral lower extremity claudication, left worse than right. He has to stop and rest after half a block. He also developed cyanosis in the left great toe. His ABIs were acceptable in 10/2018. However, given his symptoms this was repeated 2 weeks ago and left TONYA dropped to 0.51, dropping to 0.14 with exercise and 0.88 on the right, changing to 0.91 after exercise. He was evaluated again by Interventional Radiology and an angiogram was recommended, for which he presented today.    He underwent pelvic angiogram, bilateral leg angiogram with angioplasty and stent placement to left common iliac artery.    Follow post angiogram and angioplasty protocol  Monitor access site CMS  IV fluids  Start atorvastatin 40 mg daily  Discussed benefits of supervised exercise program and he is willing to participate order placed and sheet given to the patient  He was advised to quit smoking and willing to take pills previously tried Chantix which did not agreeable with him.    Initiate Wellbutrin 150 mg daily for 3 days then followed by twice daily  NicoDerm 7 mg patches if needed    Continue rest of the medications same    This note was  dictated by utilizing dragon software    Thank you for the consultation    Copy of this dictation to Dr. Almaraz interventional radiologist and primary care physician        Oh Robles MD, [facility]  Vascular medicine service   6/19/2019          Assessment & Plan   1. Peripheral arterial disease s/p right common iliac artery stenting and right popliteal artery angioplasty 2007 now presenting with bilateral (left > right) lower extremity claudication    He presented for an angiogram today. Post-procedure cares and follow up per Interventional Radiology. He was on aspirin 81 mg daily prior to the procedure. This should be continued. Any additional anti-platelet medication will be per IR and dependant on what they do during the procedure. He would likely benefit from enrolling in a supervised exercise program to increase his pain free walking distance. A referral will be placed for him.     2. Hyperlipidemia    His lipid panel today is significant for an LDL of 58, HDL 46, triglycerides 250, and total cholesterol 154. He is not on any lipid lowering medications. Given his peripheral arterial disease, despite his LDL being at goal of less than 70 without a statin, he should optimally be on a moderate to high dose of a maximally efficacious statin. Therefore, he will be initiated on Atorvastatin 40 mg daily. He should continue this indefinitely.     3. Ongoing tobacco use    He has been smoking since the age of 16, now smoking about 5 cigarettes per day. The importance of complete smoking cessation was stressed, particularly in the setting of progressive vascular disease. He has tried quitting with Chantix in the past but stopped this as he developed a rash. He has tried with patches and gum, but was unsuccessful. He is agreeable to trying Wellbutrin to decrease his interest in smoking along with 7 mg nicotine patches to take away the nicotine urge. This will be prescribed.     4. Asymptomatic bilateral  carotid disease    His recent carotid ultrasound was significant for 50-69% stenosis on the left and less than 50% stenosis on the right. This should be followed with an ultrasound on an annual basis, or sooner should he develop any symptoms.     Reason for Consult   Reason for consult: Asked by Dr. Almaraz of Interventional Radiology to evaluate and help manage vascular risk factors in this 73 year old male smoker with a history of hyperlipidemia, hypertension, DONNA, and PAD who underwent stenting of his right common iliac artery and angioplasty of his right popliteal artery in 2007 now presenting for an angiogram for bilateral lower extremities due to claudication.     Primary Care Physician   Ankit Pop      History of Present Illness   Alberto Dorsey is a 73 year old male smoker with a history of hyperlipidemia, hypertension, DONNA, and PAD who underwent stenting of his right common iliac artery and angioplasty of his right popliteal artery in 2007 by Dr. Mackey. He was followed on an annual basis by Interventional Radiology and had been doing well up until the past few months where he began to develop progressive worsening bilateral lower extremity claudication, left worse than right. He has to stop and rest after half a block. He also developed cyanosis in the left great toe. His ABIs were acceptable in 10/2018. However, given his symptoms this was repeated 2 weeks ago and left TONYA dropped to 0.51, dropping to 0.14 with exercise and 0.88 on the right, changing to 0.91 after exercise. He was evaluated again by Interventional Radiology and an angiogram was recommended, for which he presented today.     Past Medical History   HTN  DONNA  PAD  Hyperlipidemia  Tobacco use    Past Surgical History   Past Surgical History:   Procedure Laterality Date     ANGIOGRAM       VASCULAR SURGERY  right leg stenting       Prior to Admission Medications   Prior to Admission Medications   Prescriptions Last Dose Informant  Patient Reported? Taking?   AMLODIPINE BESYLATE PO   Yes No   Sig: Take 10 mg by mouth daily   HYDRALAZINE HCL PO   Yes No   Sig: Take 25 mg by mouth 2 times daily   aspirin 81 MG tablet   Yes No   Sig: Take 81 mg by mouth daily   lisinopril-hydrochlorothiazide (PRINZIDE,ZESTORETIC) 20-25 MG per tablet   Yes No   Sig: Take 1 tablet by mouth daily   triamcinolone (KENALOG) 0.1 % cream   Yes No   Sig: Apply topically 2 times daily      Facility-Administered Medications: None     Allergies   No Known Allergies    Social History   Alberto Dorsey  reports that he has been smoking cigarettes.  He has a 24.00 pack-year smoking history. He has never used smokeless tobacco. He reports that he drinks alcohol. He reports that he does not use drugs.    Family History   Family History   Problem Relation Age of Onset     Coronary Artery Disease Mother      Coronary Artery Disease Father      Diabetes Sister        Review of Systems   The 10 point Review of Systems is negative other than noted in the HPI or here.     Physical Exam   Temp: 98  F (36.7  C) Temp src: Oral BP: 155/83 Pulse: 82   Resp: 18 SpO2: 98 % O2 Device: None (Room air)    Vital Signs with Ranges  Temp:  [98  F (36.7  C)] 98  F (36.7  C)  Pulse:  [82] 82  Resp:  [18] 18  BP: (153-155)/(79-83) 155/83  SpO2:  [98 %] 98 %  171 lbs 0 oz    Constitutional: awake, alert, cooperative, no apparent distress, and appears stated age  Eyes: Lids and lashes normal, pupils equal, round and reactive to light, extra ocular muscles intact, sclera clear, conjunctiva normal  ENT: normocepalic, without obvious abnormality, oropharynx pink and moist  Hematologic / Lymphatic: no lymphadenopathy  Respiratory: No increased work of breathing, good air exchange, clear to auscultation bilaterally, no crackles or wheezing  Cardiovascular: regular rate and rhythm, normal S1 and S2 and no murmur noted  GI: Normal bowel sounds, soft, non-distended, non-tender  Skin: no redness, warmth, or  swelling, no rashes and no lesions  Musculoskeletal: There is no redness, warmth, or swelling of the joints.  Full range of motion noted.  Motor strength is 5 out of 5 all extremities bilaterally.  Tone is normal.  Neurologic: Awake, alert, oriented to name, place and time.  Cranial nerves II-XII are grossly intact.  Motor is 5 out of 5 bilaterally.    Neuropsychiatric:  Normal affect, memory, insight.  Pulses: Palpable right DP and PT pulses. Unable to palpate left pedal pulses. No carotid bruits appreciated.     Data   Most Recent 3 CBC's:  Recent Labs   Lab Test 10/08/15  1150   WBC 7.1   HGB 12.1*   MCV 92        Most Recent 3 BMP's:  Recent Labs   Lab Test 06/19/19  1030   CR 0.96     Most Recent 3 INR's:  Recent Labs   Lab Test 06/19/19  1030   INR 0.90     Most Recent Cholesterol Panel:  Recent Labs   Lab Test 06/19/19  1030   CHOL 154   LDL 58   HDL 46   TRIG 250*     Most Recent Hemoglobin A1c:  Recent Labs   Lab Test 06/19/19  1030   A1C 5.4

## 2019-06-19 NOTE — DISCHARGE INSTRUCTIONS
Peripheral Angiogram Discharge Instructions - Femoral     After you go home:      Have an adult stay with you until tomorrow.    Drink extra fluids for 2 days.    You may resume your normal diet.    No smoking       For 24 hours - due to the sedation you received:    Relax and take it easy.    Do NOT make any important or legal decisions.    Do NOT drive or operate machines at home or at work.    Do NOT drink alcohol.    Care of Groin Puncture Site:      For the first 24 hrs - check the puncture site every 1-2 hours while awake.    For 2 days, when you cough, sneeze, laugh or move your bowels, hold your hand over the puncture site and press firmly.    Remove the bandaid after 24 hours. If there is minor oozing, apply another bandaid and remove it after 12 hours.    It is normal to have a small bruise or pea size lump at the site.    You may shower tomorrow.  Do NOT take a bath, or use a hot tub or pool for at least 3 days. Do NOT scrub the site. Do not use lotion or powder near the puncture site.     Activity:            For 2 days:    No stooping or squatting    Do NOT do any heavy activity such as exercise, lifting, or straining.     No housework, yard work or any activity that make you sweat    Do NOT lift more than 10 pounds    Bleeding:      If you start bleeding from the site in your groin, lie down flat and press firmly on/above the site for 10 minutes.     Once bleeding stops, lay flat for 2 hours.     Call the [facility] as soon as you can.       Call 911 right away if you have heavy bleeding or bleeding that does not stop.      Medicines:        If you are taking an antiplatelet medication such as Plavix, do not stop taking it until you talk to your provider.         Take your medications, including blood thinners, unless your provider tells you not to.       If you have stopped any medicines, check with your provider about when to restart them.        Follow Up Appointments:      Follow up  with [facility] as directed.    Call the clinic if:      You have increased pain or a large or growing hard lump around the site.    The site is red, swollen, hot or tender.    Blood or fluid is draining from the site.    You have chills or a fever greater than 101 F (38 C).    Your leg feels numb, cool or changes color.    You have hives, a rash or unusual itching.    New pain in the back or belly that you cannot control with Tylenol.    Any questions or concerns.    Other Instructions:      If you received a stent - carry your stent card with you at all times.      If you have questions or your original symptoms do not improve, call:         [facility] @ [phone]

## 2019-06-19 NOTE — IR NOTE
Interventional Radiology Intra-procedural Nursing Note    Patient Name: Alberto Dorsey  Medical Record Number: [id number]  Today's Date: June 19, 2019    Start Time: 1502  End of procedure time: 1618  Procedure: Pelvic Angiogram, Bilateral Leg Angiogram with angioplasty and stent placement to Left Common Iliac Artery.  Report given to: GARRETT Hurtado in Care Suites  Time pt departs:  1656      Other Notes: Pt. transferred to IR table. Prepped and draped appropriately. Monitoring equipment applied. NC applied. No complaints of pain at this time. Timeout recorded.    6f sheath to Right femoral artery. Removed at 1620. Right DP by doppler, cms intact. Manual pressure held to site for 20 minutes. Hemostasis at 1640. Right groin site dressed with quick clot gauze and covered with tegaderm. C/D/I, soft.    7f sheath to Left femoral artery. Removed at 1628. Left DP by doppler, cms intact, ex. Great toe cyanosis (ongoing). Manual pressure held to site for 22 minutes. Hemostasis at 1650. Left groin site dressed with quick clot gauze and covered with tegaderm. C/D/I, soft.    Pt. Tolerated procedure well, relaxed. No complaints of pain.    Medications administered:    Fentanyl 200 mcg IVP  Versed 4 mg IVP

## 2019-06-19 NOTE — PROGRESS NOTES
Care Suites Post-Procedure Note    Procedure: Bilateral LE angiogram  CS arrival time: 1655  Accompanied by: IR RN  Concerns/abnormal assessment after procedure: No  Plan: Continue post procedure plan of care    Both groins site CDI, dressing dry intact, pulses are dopplerable.  Anticipate 5 hours bedrest from hemostasis time after sheath removal. Off bedrest at 2200.  Discharge to home around 2230 if stable groin sites.     Pt is taking po fluids well. Encourage po fluids intake.    Anticipate transfer pt to Observation care unit remainder time of bedrest.   Discharge instruction and medications are given to pt. All questions are answered.   Detailed report given to GARRETT Hurtado to continue monitoring pt.

## 2019-06-19 NOTE — PROGRESS NOTES
Reason for admission: leg angio  CS arrival time: 1000  Accompanied by: Pt's friend will pick Pt up  Name/Phone of discontinue :   Medications held: none  Consent signed: yes  Abnormal assessment/labs:   If, abnormal, provider notified:   Education/questions answered: yes  Plan:

## 2019-06-19 NOTE — PROGRESS NOTES
Interventional Radiology Pre-Procedure Sedation Assessment   Time of Assessment: 1:32 PM    Expected Level: Moderate Sedation    Indication: Sedation is required for the following type of Procedure: Pelvic angiogram, bilateral leg angiogram with possible angioplasty and/or stent placement    Sedation and procedural consent: Risks, benefits and alternatives were discussed with Patient    PO Intake: Appropriately NPO for procedure    ASA Class: Class 2 - MILD SYSTEMIC DISEASE, NO ACUTE PROBLEMS, NO FUNCTIONAL LIMITATIONS.    Mallampati: Grade 2:  Soft palate, base of uvula, tonsillar pillars, and portion of posterior pharyngeal wall visible    Lungs: Lungs Clear with good breath sounds bilaterally    Heart: Normal heart sounds and rate    History and physical reviewed and no updates needed. I have reviewed the lab findings, diagnostic data, medications, and the plan for sedation. I have determined this patient to be an appropriate candidate for the planned sedation and procedure and have reassessed the patient IMMEDIATELY PRIOR to sedation and procedure.    Darya Soriano, APRN CNP

## 2019-06-20 ENCOUNTER — TELEPHONE (OUTPATIENT)
Dept: OTHER | Facility: CLINIC | Age: 74
End: 2019-06-20

## 2019-06-20 NOTE — PLAN OF CARE
A&Ox4, VSS on RA. Bilateral groin site CDI, dressing dry intact, pulses are dopplerable. Patient was off bedrest @ 2200 and walked around room. Groin sites intact, CDI. Taking PO fluids well. Patient voiding adequately.Patient adequate for discharge. IV removed. Patients friend is coming to pick him up. Discharge instructions reviewed with previous shift RN in care and discharge medications already given to patient. Patient reminded to hold groin sites when coughing, sneezing.  Wheelchair transport provided by [facility].

## 2019-06-20 NOTE — PROGRESS NOTES
1850 Report received from Andrew Meléndez RN.  1900 Pt A/O. Gauze drsg CDI to bilateral groin puncture sites. No oozing or hematoma noted. Area soft & flat. Pt denies pain.   Activity restrictions reinforced with pt as pt frequently moving legs & hips about in bed. Verbal understanding received. No family present at this time. Pt will call friend to come pick him up at discharge. Wife at home.  1905 Detailed report called to Tami Fox RN in OBS. Pt on bedrest until 2200.  1915 Pt needs reminders to hold pressure to puncture sites prior to coughing.  1930 Pt transferred to OBS rm 17. All personal belongings sent w/ pt.

## 2019-06-20 NOTE — TELEPHONE ENCOUNTER
Spoke with Alberto.  Doing fine.  Both groin sites are without swelling or bleeding.  He is tender in the groin area. He is walking but limited today, will advance tomorrow.  Only concern is that he was started on a cholesterol lower medication and wonders why.  I did discuss that his triglycerides are elevated and also he has evidence of plaque formation in his arteries.    He would like to talk with Tracy regarding the medication.  I told him I will let her know.    Will see patient in 1 month.  He is instructed to contact us if questions or concerns.    Eusebia Alves RN  IR nurse clinician  [phone]

## 2019-06-21 DIAGNOSIS — I73.9 PAD (PERIPHERAL ARTERY DISEASE) (H): ICD-10-CM

## 2019-06-21 DIAGNOSIS — R09.89 OTHER SPECIFIED SYMPTOMS AND SIGNS INVOLVING THE CIRCULATORY AND RESPIRATORY SYSTEMS: Primary | ICD-10-CM

## 2019-08-05 ENCOUNTER — HOSPITAL ENCOUNTER (OUTPATIENT)
Dept: ULTRASOUND IMAGING | Facility: CLINIC | Age: 74
End: 2019-08-05
Attending: RADIOLOGY
Payer: COMMERCIAL

## 2019-08-05 ENCOUNTER — HOSPITAL ENCOUNTER (OUTPATIENT)
Dept: ULTRASOUND IMAGING | Facility: CLINIC | Age: 74
Discharge: HOME | End: 2019-08-05
Attending: RADIOLOGY | Admitting: RADIOLOGY
Payer: COMMERCIAL

## 2019-08-05 ENCOUNTER — OFFICE VISIT (OUTPATIENT)
Dept: OTHER | Facility: CLINIC | Age: 74
End: 2019-08-05
Attending: RADIOLOGY
Payer: COMMERCIAL

## 2019-08-05 VITALS — HEART RATE: 87 BPM | DIASTOLIC BLOOD PRESSURE: 65 MMHG | SYSTOLIC BLOOD PRESSURE: 122 MMHG

## 2019-08-05 DIAGNOSIS — I73.9 PERIPHERAL ARTERIAL DISEASE (H): Primary | ICD-10-CM

## 2019-08-05 DIAGNOSIS — I73.9 PAD (PERIPHERAL ARTERY DISEASE) (H): ICD-10-CM

## 2019-08-05 DIAGNOSIS — R09.89 OTHER SPECIFIED SYMPTOMS AND SIGNS INVOLVING THE CIRCULATORY AND RESPIRATORY SYSTEMS: ICD-10-CM

## 2019-08-05 PROCEDURE — G0463 HOSPITAL OUTPT CLINIC VISIT: HCPCS | Mod: 25

## 2019-08-05 PROCEDURE — 93924 LWR XTR VASC STDY BILAT: CPT

## 2019-08-05 PROCEDURE — 93978 VASCULAR STUDY: CPT

## 2019-08-05 NOTE — NURSING NOTE
"Alberto Dorsey is a 73 year old male who presents for:  Chief Complaint   Patient presents with     Consult     TONYA w/ex, Aort/ivc/iliac comp (8:00 [facility], 1:00 W) bilateral common iliac stent, s/p left NATALYA stent and PTA right NATALYA stent, left leg claudication 6/19 with Dr. Almaraz. 1 mo f/u        Vitals:    Vitals:    08/05/19 1306   BP: 122/65   BP Location: Right arm   Patient Position: Chair   Cuff Size: Adult Regular   Pulse: 87       BMI:  Estimated body mass index is 22.56 kg/m  as calculated from the following:    Height as of 6/19/19: 6' 1\" (1.854 m).    Weight as of 6/19/19: 171 lb (77.6 kg).    Pain Score:  Data Unavailable        Kecia Perez MA    "

## 2019-08-05 NOTE — PROGRESS NOTES
Patient Name: Alberto Dorsey  Patient MRN: [id number]  Patient : 1945    HPI: Alberto Dorsey is a 73 year old male with a history of hypertension, current smoking and periperal arterial disease s/p right common iliac artery stent and right popliteal artery angioplasty done in  with Dr. Mackey and Right common iliac artery stent angioplasty and left common iliac artery stent placement a month ago 19 after having progressive bilateral lower extremity claudication left > right. He is here today for his one month angiogram follow up.      He is feeling much better especially in his left leg. He feels that his quality of life has improved and he can be active doing the things he needs to do like going grocery shopping. He does notice some muscle cramping in his calf after walking for 10-15 minutes and he feels some of this is due because his muscles are deconditioned from not exercising much last year. His right leg is a little better. He wasn't too descriptive but stated he gets a small pain off and on but it's not bad. He stated the purple color of his big toes is gone now.      OBJECTIVE:   Vitals: 122/87 Right arm   General: Alert, oriented, pleasant male in NAD  Lower Extremity: Feet warm, no c/o CMS changes, No cyanosis or discoloration to toes or feet.   Right-Pedal-Palpable pedal pulse, post tibial-+ doppler  Left-Doppler pulses both pedal and post tibial    Imaging:   Results for orders placed or performed during the hospital encounter of 19   US TONYA Doppler with Exercise Bilateral    Narrative    ULTRASOUND ANKLE-BRACHIAL INDEX DOPPLER WITH EXERCISE BILATERAL    2019 9:21 AM     HISTORY: Bilateral common iliac stent, status post left NATALYA stent and PTA right NATALYA stent, left leg claudication  2019 with Dr. Almaraz. One-month followup. PAD (peripheral artery disease) (H).    COMPARISON: 2019    FINDINGS:  Right TONYA: 0.99, previously 0.88.  Left TONYA: 0.64, previously 0.51.    Waveforms:  Triphasic on the right, biphasic on the left.    Exercise: Patient exercised on treadmill for 5 minutes at 1.5 miles per hour at 10% incline with no symptoms.    Right exercise TONYA: 0.81, previously 0.91.  Left exercise TONYA: 0.5, previously 0.41      Impression    IIMPRESSION:   1. Right resting TONYA is normal with moderate exercise-induced arterial insufficiency.  2. Left resting and exercise ABIs show moderate arterial insufficiency. Post exercise ABIs significantly improved when compared to the prior study.    TONYA CRITERIA:  >0.95 Normal  0.90 - 0.94 Mild  0.5 - 0.89 Moderate  0.2 - 0.49 Severe  <0.2 Critical     ULTRASOUND  AORTA/IVC/ILIAC DUPLEX COMPLETE   8/5/2019 9:21 AM     HISTORY: Bilateral common iliac stent, status post left NATALYA stent and PTA right NATALYA stent, left leg claudication  6/19 with Dr. Almaraz. 1-month followup. Other specified symptoms and signs involving the circulatory and respiratory systems.     COMPARISON: Angiogram 6/12/2019     FINDINGS: Color Doppler and spectral waveform analysis was performed throughout the right and left common iliac artery stents. Bilateral common iliac artery stents are patent. Diameters on the right range from 5.7 to 7.0 mm. Inflow and outflow arteries are patent. Diameters on the left range from 5.0 to 8.4 mm. Inflow and outflow arteries are patent. Elevated velocities are noted in the right external iliac  artery with peak systolic velocity up to 259 cm/s, without significant luminal narrowing.                                                                  IMPRESSION: Patent bilateral common iliac artery stents.    Assessment/Plan  (I73.9) Peripheral arterial disease (H)  (primary encounter diagnosis)  Comment: Improvement overall in the ABIs and US. During the angiogram he had a focal severe stenosis in the proximal left common iliac artery treated with a stent. A focal sever stenosis of the common femoral and popliteal artery were present but it was felt  that these could improve with the increase in run off down his leg. If his symptoms don't improve he would need a femoral endarterectomy and popliteal angioplasty.     Plan: I will see Alberto in a year along with an TONYA and Arterial duplex US. He will get a letter to remind him of this visit and we will help him to schedule.     Alberto is to call us if he has any changes in his symptoms as listed above and we will see him at that time. He is comfortable advocating for himself and he has done this before. He was comfortable with this plan.     I met with the patient for 30 minutes of which >50% of the time was used to discuss treatment options and/or coordination of care.    Thank you for the consult. We will continue to monitor Alberto his vascular needs.    Marcelle Almaraz, DO  Interventional Radiology  [facility]

## 2019-08-07 DIAGNOSIS — R09.89 OTHER SPECIFIED SYMPTOMS AND SIGNS INVOLVING THE CIRCULATORY AND RESPIRATORY SYSTEMS: Primary | ICD-10-CM

## 2019-08-07 DIAGNOSIS — I73.9 PAD (PERIPHERAL ARTERY DISEASE) (H): ICD-10-CM

## 2019-09-12 ENCOUNTER — TELEPHONE (OUTPATIENT)
Dept: OTHER | Facility: CLINIC | Age: 74
End: 2019-09-12

## 2019-09-12 NOTE — TELEPHONE ENCOUNTER
Alberto called regarding significant claudication in the left leg.  He states its pretty difficult to walk any amount of distance.  He knows that when he had the angiogram that they mentioned another blockage.  Wondered if there is anything that can be done. He denies rest pain.     Reviewed with Dr. Almaraz.  She recommends that the patient be scheduled for clinic consult with Dr. Leonard.  That when the angiogram was done she had consulted with Dr. Leonard.  Patient likely needs a femoral endarectomy with angioplasty of popliteal artery.  That it would be a tandem procedure.    Discussed with Alberto her recommendations.  He agreed.  Will route to Dr. Leonard's  and his nurse. Will have Alberto set up for clinic consult with Dr. Leonard.  Eusebia Alves RN  IR nurse clinician  [phone]

## 2019-09-12 NOTE — TELEPHONE ENCOUNTER
ARMANDM requesting pt to call back to schedule OV only with Dr. Leonrad.  Natalie Clark, BSN, RN

## 2019-10-02 NOTE — PROGRESS NOTES
[facility]    Alberto Dorsey was referred to see me today by Dr. Marcelle Almaraz of interventional radiology.  74-year-old patient with history of hypertension, ongoing tobacco abuse has a history of PAD.  He underwent a right common iliac artery stent and right popliteal artery angioplasty in 2007.    He had increasing left leg claudication symptoms.  Angiography was performed on 6/19/2018.  Mild restenosis of the right common iliac artery stent was noted.  Angioplasty of the stented common iliac artery was performed with good results.  Essentially normal runoff was noted distally.  High-grade stenosis left common carotid was angioplastied and stented.  Found to have calcified stenosis left common femoral artery and a focal occlusion of the mid popliteal artery.  Anterior tibial and peroneal runoff is noted.  Proximal posterior tibial artery occluded the reconstitutes in the mid calf and patent in the distal foot.    Recent ankle-brachial index 0.99 in the right and 0.64 on the left with triphasic waveforms in the right and biphasic on the left.  With exercise the right decreased to 0.81 in the left 0.50    We were hoping the improved inflow from the left iliac stent would be sufficient for his left leg claudication symptoms.  However, his symptoms persist.  Good risk factor control except for the smoking.  Recent LDL= 58    Patient continues to have short distance claudication of his left leg despite the inflow problem related to primarily the femoral and popliteal disease with the femoral not being amenable to angioplasty.  He can walk less than 2 blocks and has to wait 4 to 5 minutes before his left calf claudication resolves.  He has a constant tingling and burning of his right posterior calf which is not vascular in nature.  He comes to discuss further intervention with me today.    PMH: Medications: Aspirin, Lipitor, lisinopril/HCTZ, hydralazine, Norvasc            Medical: Hypertension                                Asymptomatic mild carotid bifurcation disease                            History of smoking.  Now down to 3 cigarettes daily                          Hyperlipidemia on statin     Lives independently.    Recent LDL= 58   serum creatinine= 0.96  This decrease his smoking down to 3 cigarettes daily    Exam: Alert and appropriate.  Normal affect.              Blood pressure 154/77 left arm and 137/77 right arm.  Pulse 90 regular.              HEENT =unremarkable.  Soft right carotid bruit noted.                   Chest= clear              Cardiovascular= regular rate               Abdomen = fairly thin.  Soft, nontender.               Extremities= no distal edema.  Normal sensation.  No ulcers.                   +3 femoral pulses bilaterally.                    +3 right dorsalis pedis pulse with no palpable left pedal pulses.      I reviewed the angiographic images with the patient along with his noninvasive testing.  Good final results with the iliacs kissing stents.  However, markedly calcified focal stenosis of the left common femoral artery along with the occlusion of the left mid popliteal artery.  Runoff as described above.    Impression: Ongoing left leg symptoms certainly related to the common femoral and popliteal artery vascular issues.  Common femoral is not amenable to angioplasty would recommend due to his ongoing symptoms of a open left common femoral endarterectomy and patch angioplasty.  With a very focal popliteal occlusion at the time of the surgery will perform a left leg angiogram and see if we can recanalize this short segment occlusion.  With his two-vessel runoff that should significantly improve his symptoms.  However, if for some reason were unable to really cannulate this short segment occlusion I would do nothing further at that time to see if the improved inflow of the common femoral lesion being taking care of and collateralization would be adequate to resolve  his left leg symptoms.  If this is not the situation we would consider a bypass at a later date.    I spent 30 minutes with the patient today with 50% of counseling.  Did continue recommend smoking cessation but fortunately is decreased significantly.  We will schedule the surgery to be performed in the operating room with a 1 to 2-day post procedure hospital stay.       Chavo Leonard MD     CC: Dr. Marcelle Almaraz interventional radiology          Dr. Ankit Pop

## 2019-10-03 ENCOUNTER — OFFICE VISIT (OUTPATIENT)
Dept: OTHER | Facility: CLINIC | Age: 74
End: 2019-10-03
Attending: SURGERY
Payer: COMMERCIAL

## 2019-10-03 VITALS — HEART RATE: 88 BPM | DIASTOLIC BLOOD PRESSURE: 77 MMHG | SYSTOLIC BLOOD PRESSURE: 154 MMHG

## 2019-10-03 DIAGNOSIS — I73.9 PAD (PERIPHERAL ARTERY DISEASE) (H): Primary | ICD-10-CM

## 2019-10-03 DIAGNOSIS — E78.5 HYPERLIPIDEMIA LDL GOAL <70: ICD-10-CM

## 2019-10-03 DIAGNOSIS — F17.200 SMOKING: ICD-10-CM

## 2019-10-03 PROCEDURE — G0463 HOSPITAL OUTPT CLINIC VISIT: HCPCS

## 2019-10-03 PROCEDURE — 99204 OFFICE O/P NEW MOD 45 MIN: CPT | Mod: ZP | Performed by: SURGERY

## 2019-10-03 NOTE — NURSING NOTE
"Alberto Dorsey is a 74 year old male who presents for:  Chief Complaint   Patient presents with     Consult     New consult to discuss bypass - ref by Dr. Almaraz, see 9/12/19 phone enc. *shawna        Vitals:    Vitals:    10/03/19 0916 10/03/19 0917   BP: 137/77 (!) 154/77   BP Location: Right arm Left arm   Patient Position: Chair Chair   Cuff Size: Adult Regular Adult Regular   Pulse: 90 88       BMI:  Estimated body mass index is 22.56 kg/m  as calculated from the following:    Height as of 6/19/19: 6' 1\" (1.854 m).    Weight as of 6/19/19: 171 lb (77.6 kg).    Pain Score:  Data Unavailable        Keica Perez MA    "

## 2019-10-07 NOTE — NURSING NOTE
Late Entry: Patient Education completed 10/3/19    Procedure: Left common femoral endarterectomy with patch; left popliteal artery angioplasty  Diagnosis: PAD  Anticoagulation Instruction: continue ASA  Pre-Operative Physical Exam: You need to have a pre-op physical exam within 30 days of your procedure. Your procedure may be cancelled if you do not have a current History and Physical. Call your PCP's office to schedule.  Allergies:  Updated in Epic  Bowel Prep: n/a  Post Procedure Education: [facility] patient post-procedure fact sheet reviewed with patient.    Learner(s):patient  Method: Listening, Reading  Barriers to Learning:No Barrier  Outcome: Patient did verbalize understanding of above education.    Natalie Clark, VIANCAN, RN

## 2019-10-09 PROBLEM — I73.9 PAD (PERIPHERAL ARTERY DISEASE) (H): Status: ACTIVE | Noted: 2019-10-09

## 2019-11-01 ENCOUNTER — TELEPHONE (OUTPATIENT)
Dept: OTHER | Facility: CLINIC | Age: 74
End: 2019-11-01

## 2019-11-01 NOTE — TELEPHONE ENCOUNTER
Type of surgery: *OR30 LEFT COMMON FEMORAL ENDARTERECTOMY AND PATCH, LEFT POPLITEAL PTA(DR. RODRIGUEZ);  Location of surgery: [facility]  Date and time of surgery: 11/06/19 @ 10:30AM  Surgeon: DR. VAUGHAN  Pre-Op Appt Date: PT TO SCHEDULE WITH DR. SRINIVAS SAM  Post-Op Appt Date: PT TO SCHEDULE   Packet sent out: GIVEN 10/03/19  Pre-cert/Authorization completed:  Yes  Date: 11/01/19

## 2019-11-04 RX ORDER — LISINOPRIL 20 MG/1
20 TABLET ORAL EVERY EVENING
COMMUNITY

## 2019-11-05 ENCOUNTER — TELEPHONE (OUTPATIENT)
Dept: OTHER | Facility: CLINIC | Age: 74
End: 2019-11-05

## 2019-11-05 ENCOUNTER — ANESTHESIA EVENT (OUTPATIENT)
Dept: SURGERY | Facility: CLINIC | Age: 74
DRG: 254 | End: 2019-11-05
Payer: COMMERCIAL

## 2019-11-05 NOTE — TELEPHONE ENCOUNTER
[place] [facility]    I called Alberto Dorsey about his surgery tomorrow.  We plan a left common femoral enterectomy with bovine patch angioplasty as the main procedure.  He has occlusion of the popliteal artery which is relatively short segment.  At the time of surgery Dr. Marcelle Almaraz of interventional radiology will try to recannulate this includes segment and performed angioplasty and possibly stenting.    This is been discussed with the patient.He had no further questions.    6/19/2019 laboratory:  SCr= 0.96  LDL= 58  Hgb= 12.6    Chavo Leonard MD

## 2019-11-06 ENCOUNTER — ANESTHESIA (OUTPATIENT)
Dept: SURGERY | Facility: CLINIC | Age: 74
DRG: 254 | End: 2019-11-06
Payer: COMMERCIAL

## 2019-11-06 ENCOUNTER — HOSPITAL ENCOUNTER (INPATIENT)
Facility: CLINIC | Age: 74
LOS: 1 days | Discharge: HOME | DRG: 254 | End: 2019-11-07
Attending: SURGERY | Admitting: SURGERY
Payer: COMMERCIAL

## 2019-11-06 ENCOUNTER — APPOINTMENT (OUTPATIENT)
Dept: SURGERY | Facility: PHYSICIAN GROUP | Age: 74
End: 2019-11-06
Payer: COMMERCIAL

## 2019-11-06 ENCOUNTER — APPOINTMENT (OUTPATIENT)
Dept: INTERVENTIONAL RADIOLOGY/VASCULAR | Facility: CLINIC | Age: 74
DRG: 254 | End: 2019-11-06
Attending: SURGERY
Payer: COMMERCIAL

## 2019-11-06 DIAGNOSIS — I73.9 PAD (PERIPHERAL ARTERY DISEASE) (H): ICD-10-CM

## 2019-11-06 DIAGNOSIS — I73.9 PAD (PERIPHERAL ARTERY DISEASE) (H): Primary | ICD-10-CM

## 2019-11-06 DIAGNOSIS — I70.202: ICD-10-CM

## 2019-11-06 LAB
ABO + RH BLD: NORMAL
ABO + RH BLD: NORMAL
BLD GP AB SCN SERPL QL: NORMAL
BLOOD BANK CMNT PATIENT-IMP: NORMAL
CHOLEST SERPL-MCNC: 135 MG/DL
CREAT SERPL-MCNC: 0.9 MG/DL (ref 0.66–1.25)
GFR SERPL CREATININE-BSD FRML MDRD: 84 ML/MIN/{1.73_M2}
HBA1C MFR BLD: 5.4 % (ref 0–5.6)
HDLC SERPL-MCNC: 55 MG/DL
LDLC SERPL CALC-MCNC: 57 MG/DL
NONHDLC SERPL-MCNC: 80 MG/DL
POTASSIUM SERPL-SCNC: 3.5 MMOL/L (ref 3.4–5.3)
SPECIMEN EXP DATE BLD: NORMAL
TRIGL SERPL-MCNC: 113 MG/DL

## 2019-11-06 PROCEDURE — 27210743 ZZH CATH CR11

## 2019-11-06 PROCEDURE — 25000128 H RX IP 250 OP 636: Performed by: SURGERY

## 2019-11-06 PROCEDURE — 25000128 H RX IP 250 OP 636: Performed by: NURSE ANESTHETIST, CERTIFIED REGISTERED

## 2019-11-06 PROCEDURE — 93005 ELECTROCARDIOGRAM TRACING: CPT

## 2019-11-06 PROCEDURE — 86901 BLOOD TYPING SEROLOGIC RH(D): CPT | Performed by: SURGERY

## 2019-11-06 PROCEDURE — 36000093 ZZH SURGERY LEVEL 4 1ST 30 MIN: Performed by: SURGERY

## 2019-11-06 PROCEDURE — 36000063 ZZH SURGERY LEVEL 4 EA 15 ADDTL MIN: Performed by: SURGERY

## 2019-11-06 PROCEDURE — 25800030 ZZH RX IP 258 OP 636: Performed by: ANESTHESIOLOGY

## 2019-11-06 PROCEDURE — 25800030 ZZH RX IP 258 OP 636: Performed by: NURSE ANESTHETIST, CERTIFIED REGISTERED

## 2019-11-06 PROCEDURE — C1725 CATH, TRANSLUMIN NON-LASER: HCPCS

## 2019-11-06 PROCEDURE — 37000009 ZZH ANESTHESIA TECHNICAL FEE, EACH ADDTL 15 MIN: Performed by: SURGERY

## 2019-11-06 PROCEDURE — C1894 INTRO/SHEATH, NON-LASER: HCPCS | Performed by: SURGERY

## 2019-11-06 PROCEDURE — 83036 HEMOGLOBIN GLYCOSYLATED A1C: CPT | Performed by: SURGERY

## 2019-11-06 PROCEDURE — 25500064 ZZH RX 255 OP 636: Performed by: SURGERY

## 2019-11-06 PROCEDURE — 82565 ASSAY OF CREATININE: CPT | Performed by: SURGERY

## 2019-11-06 PROCEDURE — 37000008 ZZH ANESTHESIA TECHNICAL FEE, 1ST 30 MIN: Performed by: SURGERY

## 2019-11-06 PROCEDURE — C1769 GUIDE WIRE: HCPCS | Performed by: SURGERY

## 2019-11-06 PROCEDURE — 40000170 ZZH STATISTIC PRE-PROCEDURE ASSESSMENT II: Performed by: SURGERY

## 2019-11-06 PROCEDURE — 25000125 ZZHC RX 250: Performed by: ANESTHESIOLOGY

## 2019-11-06 PROCEDURE — C1887 CATHETER, GUIDING: HCPCS | Performed by: SURGERY

## 2019-11-06 PROCEDURE — 27210794 ZZH OR GENERAL SUPPLY STERILE: Performed by: SURGERY

## 2019-11-06 PROCEDURE — 86900 BLOOD TYPING SEROLOGIC ABO: CPT | Performed by: SURGERY

## 2019-11-06 PROCEDURE — 25000125 ZZHC RX 250: Performed by: SURGERY

## 2019-11-06 PROCEDURE — 12000000 ZZH R&B MED SURG/OB

## 2019-11-06 PROCEDURE — C1758 CATHETER, URETERAL: HCPCS | Performed by: SURGERY

## 2019-11-06 PROCEDURE — 84132 ASSAY OF SERUM POTASSIUM: CPT | Performed by: SURGERY

## 2019-11-06 PROCEDURE — 71000012 ZZH RECOVERY PHASE 1 LEVEL 1 FIRST HR: Performed by: SURGERY

## 2019-11-06 PROCEDURE — 047N3ZZ DILATION OF LEFT POPLITEAL ARTERY, PERCUTANEOUS APPROACH: ICD-10-PCS | Performed by: SURGERY

## 2019-11-06 PROCEDURE — 93010 ELECTROCARDIOGRAM REPORT: CPT | Performed by: INTERNAL MEDICINE

## 2019-11-06 PROCEDURE — 86850 RBC ANTIBODY SCREEN: CPT | Performed by: SURGERY

## 2019-11-06 PROCEDURE — 04CL0ZZ EXTIRPATION OF MATTER FROM LEFT FEMORAL ARTERY, OPEN APPROACH: ICD-10-PCS | Performed by: SURGERY

## 2019-11-06 PROCEDURE — 80061 LIPID PANEL: CPT | Performed by: SURGERY

## 2019-11-06 PROCEDURE — 25000128 H RX IP 250 OP 636: Performed by: STUDENT IN AN ORGANIZED HEALTH CARE EDUCATION/TRAINING PROGRAM

## 2019-11-06 PROCEDURE — 35371 RECHANNELING OF ARTERY: CPT | Mod: LT | Performed by: SURGERY

## 2019-11-06 PROCEDURE — 25000132 ZZH RX MED GY IP 250 OP 250 PS 637: Performed by: SURGERY

## 2019-11-06 PROCEDURE — 25000125 ZZHC RX 250: Performed by: NURSE ANESTHETIST, CERTIFIED REGISTERED

## 2019-11-06 PROCEDURE — 36415 COLL VENOUS BLD VENIPUNCTURE: CPT | Performed by: SURGERY

## 2019-11-06 PROCEDURE — 25000132 ZZH RX MED GY IP 250 OP 250 PS 637: Performed by: STUDENT IN AN ORGANIZED HEALTH CARE EDUCATION/TRAINING PROGRAM

## 2019-11-06 PROCEDURE — C1763 CONN TISS, NON-HUMAN: HCPCS | Performed by: SURGERY

## 2019-11-06 PROCEDURE — 25000566 ZZH SEVOFLURANE, EA 15 MIN: Performed by: SURGERY

## 2019-11-06 PROCEDURE — 04UL0KZ SUPPLEMENT LEFT FEMORAL ARTERY WITH NONAUTOLOGOUS TISSUE SUBSTITUTE, OPEN APPROACH: ICD-10-PCS | Performed by: SURGERY

## 2019-11-06 PROCEDURE — 99223 1ST HOSP IP/OBS HIGH 75: CPT | Performed by: INTERNAL MEDICINE

## 2019-11-06 DEVICE — IMP PATCH PERICARDIUM PHOTOFIX BOVINE 0.8X8CM PFP0.8X8: Type: IMPLANTABLE DEVICE | Site: ILIAC/FEMORALS | Status: FUNCTIONAL

## 2019-11-06 RX ORDER — SODIUM CHLORIDE, SODIUM LACTATE, POTASSIUM CHLORIDE, CALCIUM CHLORIDE 600; 310; 30; 20 MG/100ML; MG/100ML; MG/100ML; MG/100ML
INJECTION, SOLUTION INTRAVENOUS CONTINUOUS
Status: DISCONTINUED | OUTPATIENT
Start: 2019-11-06 | End: 2019-11-06 | Stop reason: HOSPADM

## 2019-11-06 RX ORDER — NEOSTIGMINE METHYLSULFATE 1 MG/ML
VIAL (ML) INJECTION PRN
Status: DISCONTINUED | OUTPATIENT
Start: 2019-11-06 | End: 2019-11-06

## 2019-11-06 RX ORDER — HYDROMORPHONE HYDROCHLORIDE 1 MG/ML
.3-.5 INJECTION, SOLUTION INTRAMUSCULAR; INTRAVENOUS; SUBCUTANEOUS
Status: DISCONTINUED | OUTPATIENT
Start: 2019-11-06 | End: 2019-11-07 | Stop reason: HOSPADM

## 2019-11-06 RX ORDER — HYDRALAZINE HYDROCHLORIDE 50 MG/1
50 TABLET, FILM COATED ORAL EVERY EVENING
COMMUNITY

## 2019-11-06 RX ORDER — ACETAMINOPHEN 325 MG/1
650 TABLET ORAL EVERY 4 HOURS PRN
Status: DISCONTINUED | OUTPATIENT
Start: 2019-11-09 | End: 2019-11-07 | Stop reason: HOSPADM

## 2019-11-06 RX ORDER — IOPAMIDOL 612 MG/ML
INJECTION, SOLUTION INTRAVASCULAR PRN
Status: DISCONTINUED | OUTPATIENT
Start: 2019-11-06 | End: 2019-11-06 | Stop reason: HOSPADM

## 2019-11-06 RX ORDER — CLOPIDOGREL BISULFATE 75 MG/1
300 TABLET ORAL DAILY
Status: DISCONTINUED | OUTPATIENT
Start: 2019-11-06 | End: 2019-11-06

## 2019-11-06 RX ORDER — GLYCOPYRROLATE 0.2 MG/ML
INJECTION, SOLUTION INTRAMUSCULAR; INTRAVENOUS PRN
Status: DISCONTINUED | OUTPATIENT
Start: 2019-11-06 | End: 2019-11-06

## 2019-11-06 RX ORDER — SODIUM CHLORIDE, SODIUM LACTATE, POTASSIUM CHLORIDE, CALCIUM CHLORIDE 600; 310; 30; 20 MG/100ML; MG/100ML; MG/100ML; MG/100ML
INJECTION, SOLUTION INTRAVENOUS CONTINUOUS PRN
Status: DISCONTINUED | OUTPATIENT
Start: 2019-11-06 | End: 2019-11-06

## 2019-11-06 RX ORDER — ONDANSETRON 4 MG/1
4 TABLET, ORALLY DISINTEGRATING ORAL EVERY 6 HOURS PRN
Status: DISCONTINUED | OUTPATIENT
Start: 2019-11-06 | End: 2019-11-07 | Stop reason: HOSPADM

## 2019-11-06 RX ORDER — BUPIVACAINE HYDROCHLORIDE 5 MG/ML
INJECTION, SOLUTION PERINEURAL PRN
Status: DISCONTINUED | OUTPATIENT
Start: 2019-11-06 | End: 2019-11-06 | Stop reason: HOSPADM

## 2019-11-06 RX ORDER — OXYCODONE HYDROCHLORIDE 5 MG/1
5 TABLET ORAL EVERY 4 HOURS PRN
Status: DISCONTINUED | OUTPATIENT
Start: 2019-11-06 | End: 2019-11-07 | Stop reason: HOSPADM

## 2019-11-06 RX ORDER — MAGNESIUM HYDROXIDE 1200 MG/15ML
LIQUID ORAL PRN
Status: DISCONTINUED | OUTPATIENT
Start: 2019-11-06 | End: 2019-11-06 | Stop reason: HOSPADM

## 2019-11-06 RX ORDER — LIDOCAINE 40 MG/G
CREAM TOPICAL
Status: DISCONTINUED | OUTPATIENT
Start: 2019-11-06 | End: 2019-11-07 | Stop reason: HOSPADM

## 2019-11-06 RX ORDER — DEXAMETHASONE SODIUM PHOSPHATE 4 MG/ML
INJECTION, SOLUTION INTRA-ARTICULAR; INTRALESIONAL; INTRAMUSCULAR; INTRAVENOUS; SOFT TISSUE PRN
Status: DISCONTINUED | OUTPATIENT
Start: 2019-11-06 | End: 2019-11-06

## 2019-11-06 RX ORDER — PROCHLORPERAZINE MALEATE 5 MG
5 TABLET ORAL EVERY 6 HOURS PRN
Status: DISCONTINUED | OUTPATIENT
Start: 2019-11-06 | End: 2019-11-07 | Stop reason: HOSPADM

## 2019-11-06 RX ORDER — FENTANYL CITRATE 50 UG/ML
INJECTION, SOLUTION INTRAMUSCULAR; INTRAVENOUS PRN
Status: DISCONTINUED | OUTPATIENT
Start: 2019-11-06 | End: 2019-11-06

## 2019-11-06 RX ORDER — FENTANYL CITRATE 50 UG/ML
25-50 INJECTION, SOLUTION INTRAMUSCULAR; INTRAVENOUS
Status: DISCONTINUED | OUTPATIENT
Start: 2019-11-06 | End: 2019-11-06 | Stop reason: HOSPADM

## 2019-11-06 RX ORDER — LIDOCAINE HYDROCHLORIDE 20 MG/ML
INJECTION, SOLUTION INFILTRATION; PERINEURAL PRN
Status: DISCONTINUED | OUTPATIENT
Start: 2019-11-06 | End: 2019-11-06

## 2019-11-06 RX ORDER — ONDANSETRON 2 MG/ML
4 INJECTION INTRAMUSCULAR; INTRAVENOUS EVERY 6 HOURS PRN
Status: DISCONTINUED | OUTPATIENT
Start: 2019-11-06 | End: 2019-11-07 | Stop reason: HOSPADM

## 2019-11-06 RX ORDER — CLOPIDOGREL BISULFATE 75 MG/1
75 TABLET ORAL DAILY
Status: DISCONTINUED | OUTPATIENT
Start: 2019-11-07 | End: 2019-11-07 | Stop reason: HOSPADM

## 2019-11-06 RX ORDER — PROPOFOL 10 MG/ML
INJECTION, EMULSION INTRAVENOUS PRN
Status: DISCONTINUED | OUTPATIENT
Start: 2019-11-06 | End: 2019-11-06

## 2019-11-06 RX ORDER — AMLODIPINE BESYLATE 10 MG/1
10 TABLET ORAL EVERY EVENING
Status: DISCONTINUED | OUTPATIENT
Start: 2019-11-06 | End: 2019-11-07 | Stop reason: HOSPADM

## 2019-11-06 RX ORDER — HEPARIN SODIUM 1000 [USP'U]/ML
INJECTION, SOLUTION INTRAVENOUS; SUBCUTANEOUS PRN
Status: DISCONTINUED | OUTPATIENT
Start: 2019-11-06 | End: 2019-11-06

## 2019-11-06 RX ORDER — ONDANSETRON 2 MG/ML
INJECTION INTRAMUSCULAR; INTRAVENOUS PRN
Status: DISCONTINUED | OUTPATIENT
Start: 2019-11-06 | End: 2019-11-06

## 2019-11-06 RX ORDER — ONDANSETRON 2 MG/ML
4 INJECTION INTRAMUSCULAR; INTRAVENOUS EVERY 30 MIN PRN
Status: DISCONTINUED | OUTPATIENT
Start: 2019-11-06 | End: 2019-11-06 | Stop reason: HOSPADM

## 2019-11-06 RX ORDER — AMOXICILLIN 250 MG
2 CAPSULE ORAL 2 TIMES DAILY
Status: DISCONTINUED | OUTPATIENT
Start: 2019-11-06 | End: 2019-11-07 | Stop reason: HOSPADM

## 2019-11-06 RX ORDER — CEFAZOLIN SODIUM 2 G/100ML
2 INJECTION, SOLUTION INTRAVENOUS
Status: COMPLETED | OUTPATIENT
Start: 2019-11-06 | End: 2019-11-06

## 2019-11-06 RX ORDER — NALOXONE HYDROCHLORIDE 0.4 MG/ML
.1-.4 INJECTION, SOLUTION INTRAMUSCULAR; INTRAVENOUS; SUBCUTANEOUS
Status: DISCONTINUED | OUTPATIENT
Start: 2019-11-06 | End: 2019-11-07 | Stop reason: HOSPADM

## 2019-11-06 RX ORDER — ASPIRIN 81 MG/1
81 TABLET ORAL DAILY
Status: DISCONTINUED | OUTPATIENT
Start: 2019-11-07 | End: 2019-11-07 | Stop reason: HOSPADM

## 2019-11-06 RX ORDER — ONDANSETRON 4 MG/1
4 TABLET, ORALLY DISINTEGRATING ORAL EVERY 30 MIN PRN
Status: DISCONTINUED | OUTPATIENT
Start: 2019-11-06 | End: 2019-11-06 | Stop reason: HOSPADM

## 2019-11-06 RX ORDER — CEFAZOLIN SODIUM 1 G/3ML
1 INJECTION, POWDER, FOR SOLUTION INTRAMUSCULAR; INTRAVENOUS EVERY 8 HOURS
Status: COMPLETED | OUTPATIENT
Start: 2019-11-06 | End: 2019-11-07

## 2019-11-06 RX ORDER — EPHEDRINE SULFATE 50 MG/ML
INJECTION, SOLUTION INTRAMUSCULAR; INTRAVENOUS; SUBCUTANEOUS PRN
Status: DISCONTINUED | OUTPATIENT
Start: 2019-11-06 | End: 2019-11-06

## 2019-11-06 RX ORDER — HYDROMORPHONE HYDROCHLORIDE 1 MG/ML
.3-.5 INJECTION, SOLUTION INTRAMUSCULAR; INTRAVENOUS; SUBCUTANEOUS EVERY 5 MIN PRN
Status: DISCONTINUED | OUTPATIENT
Start: 2019-11-06 | End: 2019-11-06 | Stop reason: HOSPADM

## 2019-11-06 RX ORDER — NALOXONE HYDROCHLORIDE 0.4 MG/ML
.1-.4 INJECTION, SOLUTION INTRAMUSCULAR; INTRAVENOUS; SUBCUTANEOUS
Status: DISCONTINUED | OUTPATIENT
Start: 2019-11-06 | End: 2019-11-06

## 2019-11-06 RX ORDER — IBUPROFEN 200 MG
200-400 TABLET ORAL
Status: ON HOLD | COMMUNITY
End: 2019-11-07

## 2019-11-06 RX ORDER — HYDRALAZINE HYDROCHLORIDE 25 MG/1
25 TABLET, FILM COATED ORAL EVERY MORNING
Status: DISCONTINUED | OUTPATIENT
Start: 2019-11-07 | End: 2019-11-07 | Stop reason: HOSPADM

## 2019-11-06 RX ORDER — AMOXICILLIN 250 MG
1 CAPSULE ORAL 2 TIMES DAILY
Status: DISCONTINUED | OUTPATIENT
Start: 2019-11-06 | End: 2019-11-07 | Stop reason: HOSPADM

## 2019-11-06 RX ADMIN — SODIUM CHLORIDE, SODIUM LACTATE, POTASSIUM CHLORIDE, CALCIUM CHLORIDE: 600; 310; 30; 20 INJECTION, SOLUTION INTRAVENOUS at 10:46

## 2019-11-06 RX ADMIN — FENTANYL CITRATE 50 MCG: 50 INJECTION, SOLUTION INTRAMUSCULAR; INTRAVENOUS at 11:35

## 2019-11-06 RX ADMIN — DEXMEDETOMIDINE HYDROCHLORIDE 8 MCG: 100 INJECTION, SOLUTION INTRAVENOUS at 13:06

## 2019-11-06 RX ADMIN — PHENYLEPHRINE HYDROCHLORIDE 100 MCG: 10 INJECTION INTRAVENOUS at 11:46

## 2019-11-06 RX ADMIN — GLYCOPYRROLATE 0.4 MG: 0.2 INJECTION, SOLUTION INTRAMUSCULAR; INTRAVENOUS at 13:05

## 2019-11-06 RX ADMIN — PHENYLEPHRINE HYDROCHLORIDE 0.25 MCG/KG/MIN: 10 INJECTION INTRAVENOUS at 10:59

## 2019-11-06 RX ADMIN — PROPOFOL 150 MG: 10 INJECTION, EMULSION INTRAVENOUS at 10:41

## 2019-11-06 RX ADMIN — HYDROMORPHONE HYDROCHLORIDE 0.3 MG: 1 INJECTION, SOLUTION INTRAMUSCULAR; INTRAVENOUS; SUBCUTANEOUS at 13:14

## 2019-11-06 RX ADMIN — SENNOSIDES AND DOCUSATE SODIUM 1 TABLET: 8.6; 5 TABLET ORAL at 21:03

## 2019-11-06 RX ADMIN — HEPARIN SODIUM 3000 UNITS: 1000 INJECTION, SOLUTION INTRAVENOUS; SUBCUTANEOUS at 12:31

## 2019-11-06 RX ADMIN — PROPOFOL 60 MG: 10 INJECTION, EMULSION INTRAVENOUS at 11:35

## 2019-11-06 RX ADMIN — FENTANYL CITRATE 100 MCG: 50 INJECTION, SOLUTION INTRAMUSCULAR; INTRAVENOUS at 10:42

## 2019-11-06 RX ADMIN — ROCURONIUM BROMIDE 10 MG: 10 INJECTION INTRAVENOUS at 12:25

## 2019-11-06 RX ADMIN — Medication 5 MG: at 11:20

## 2019-11-06 RX ADMIN — ROCURONIUM BROMIDE 50 MG: 10 INJECTION INTRAVENOUS at 10:43

## 2019-11-06 RX ADMIN — CEFAZOLIN SODIUM 2 G: 2 INJECTION, SOLUTION INTRAVENOUS at 10:54

## 2019-11-06 RX ADMIN — NEOSTIGMINE METHYLSULFATE 3 MG: 1 INJECTION, SOLUTION INTRAVENOUS at 13:05

## 2019-11-06 RX ADMIN — MIDAZOLAM 2 MG: 1 INJECTION INTRAMUSCULAR; INTRAVENOUS at 10:37

## 2019-11-06 RX ADMIN — ROCURONIUM BROMIDE 10 MG: 10 INJECTION INTRAVENOUS at 12:04

## 2019-11-06 RX ADMIN — CEFAZOLIN SODIUM 1 G: 2 INJECTION, SOLUTION INTRAVENOUS at 12:47

## 2019-11-06 RX ADMIN — ROCURONIUM BROMIDE 10 MG: 10 INJECTION INTRAVENOUS at 11:39

## 2019-11-06 RX ADMIN — PROPOFOL 50 MG: 10 INJECTION, EMULSION INTRAVENOUS at 10:46

## 2019-11-06 RX ADMIN — DEXMEDETOMIDINE HYDROCHLORIDE 8 MCG: 100 INJECTION, SOLUTION INTRAVENOUS at 10:47

## 2019-11-06 RX ADMIN — HEPARIN SODIUM 5000 UNITS: 1000 INJECTION, SOLUTION INTRAVENOUS; SUBCUTANEOUS at 11:33

## 2019-11-06 RX ADMIN — ONDANSETRON 4 MG: 2 INJECTION INTRAMUSCULAR; INTRAVENOUS at 13:03

## 2019-11-06 RX ADMIN — AMLODIPINE BESYLATE 10 MG: 10 TABLET ORAL at 21:03

## 2019-11-06 RX ADMIN — Medication 1 LOZENGE: at 19:45

## 2019-11-06 RX ADMIN — FENTANYL CITRATE 50 MCG: 50 INJECTION, SOLUTION INTRAMUSCULAR; INTRAVENOUS at 12:59

## 2019-11-06 RX ADMIN — LIDOCAINE HYDROCHLORIDE 80 MG: 20 INJECTION, SOLUTION INFILTRATION; PERINEURAL at 10:41

## 2019-11-06 RX ADMIN — NEOSTIGMINE METHYLSULFATE 1 MG: 1 INJECTION, SOLUTION INTRAVENOUS at 13:08

## 2019-11-06 RX ADMIN — SODIUM CHLORIDE, POTASSIUM CHLORIDE, SODIUM LACTATE AND CALCIUM CHLORIDE: 600; 310; 30; 20 INJECTION, SOLUTION INTRAVENOUS at 09:38

## 2019-11-06 RX ADMIN — CLOPIDOGREL BISULFATE 300 MG: 75 TABLET ORAL at 16:02

## 2019-11-06 RX ADMIN — HYDROMORPHONE HYDROCHLORIDE 0.2 MG: 1 INJECTION, SOLUTION INTRAMUSCULAR; INTRAVENOUS; SUBCUTANEOUS at 13:29

## 2019-11-06 RX ADMIN — LIDOCAINE HYDROCHLORIDE 0.2 ML: 10 INJECTION, SOLUTION EPIDURAL; INFILTRATION; INTRACAUDAL; PERINEURAL at 09:39

## 2019-11-06 RX ADMIN — GLYCOPYRROLATE 0.2 MG: 0.2 INJECTION, SOLUTION INTRAMUSCULAR; INTRAVENOUS at 13:08

## 2019-11-06 RX ADMIN — PROPOFOL 20 MG: 10 INJECTION, EMULSION INTRAVENOUS at 13:13

## 2019-11-06 RX ADMIN — PHENYLEPHRINE HYDROCHLORIDE 100 MCG: 10 INJECTION INTRAVENOUS at 10:55

## 2019-11-06 RX ADMIN — DEXAMETHASONE SODIUM PHOSPHATE 4 MG: 4 INJECTION, SOLUTION INTRA-ARTICULAR; INTRALESIONAL; INTRAMUSCULAR; INTRAVENOUS; SOFT TISSUE at 11:08

## 2019-11-06 RX ADMIN — CEFAZOLIN 1 G: 1 INJECTION, POWDER, FOR SOLUTION INTRAMUSCULAR; INTRAVENOUS at 21:02

## 2019-11-06 RX ADMIN — PROPOFOL 40 MG: 10 INJECTION, EMULSION INTRAVENOUS at 12:59

## 2019-11-06 RX ADMIN — SODIUM CHLORIDE, POTASSIUM CHLORIDE, SODIUM LACTATE AND CALCIUM CHLORIDE: 600; 310; 30; 20 INJECTION, SOLUTION INTRAVENOUS at 12:01

## 2019-11-06 ASSESSMENT — ACTIVITIES OF DAILY LIVING (ADL)
ADLS_ACUITY_SCORE: 13
ADLS_ACUITY_SCORE: 11

## 2019-11-06 ASSESSMENT — ENCOUNTER SYMPTOMS: SEIZURES: 0

## 2019-11-06 ASSESSMENT — MIFFLIN-ST. JEOR: SCORE: 1592.88

## 2019-11-06 ASSESSMENT — LIFESTYLE VARIABLES: TOBACCO_USE: 0

## 2019-11-06 NOTE — ANESTHESIA CARE TRANSFER NOTE
Patient: Alberto Dorsey    Procedure(s):  LEFT COMMON FEMORAL PROXIMAL/ SUPERFICIAL FEMORAL ENDARTERECTOMY WITH BOVINE PATCH.  LEFT POPLITEAL PERCUTANEOUS TRANSLUMINAL ANGIOPLASTY.    Diagnosis: PAD (peripheral artery disease) (H) [I73.9]  Diagnosis Additional Information: No value filed.    Anesthesia Type:   General     Note:  Airway :Face Mask  Patient transferred to:PACU  Comments: Neuromuscular blockade reversed after TOF 4/4, spontaneous respirations, adequate tidal volumes, followed commands to voice, oropharynx suctioned with soft flexible catheter, extubated atraumatically, extubated with suction, airway patent after extubation.  Oxygen via facemask at 8 liters per minute to PACU. Oxygen tubing connected to wall O2 in PACU, SpO2, NiBP, and EKG monitors and alarms on and functioning, Luci Hugger warmer connected to patient gown, report on patient's clinical status given to PACU RN, RN questions answered. Handoff Report: Identifed the Patient, Identified the Reponsible Provider, Reviewed the pertinent medical history, Discussed the surgical course, Reviewed Intra-OP anesthesia mangement and issues during anesthesia, Set expectations for post-procedure period and Allowed opportunity for questions and acknowledgement of understanding      Vitals: (Last set prior to Anesthesia Care Transfer)    CRNA VITALS  11/6/2019 1307 - 11/6/2019 1343      11/6/2019             Resp Rate (set):  10        140/82  HR 77  SPO2 100        Electronically Signed By: BECKY Mazariegos CRNA  November 6, 2019  1:43 PM

## 2019-11-06 NOTE — BRIEF OP NOTE
[facility]    Brief Operative Note    Pre-operative diagnosis: PAD (peripheral artery disease) (H) [I73.9]  Post-operative diagnosis Same as pre-operative diagnosis    Procedure: Procedure(s):  LEFT COMMON FEMORAL PROXIMAL/ SUPERFICIAL FEMORAL ENDARTERECTOMY WITH BOVINE PATCH.  LEFT POPLITEAL PERCUTANEOUS TRANSLUMINAL ANGIOPLASTY.  Surgeon: Surgeon(s) and Role:     * Chavo Leonard MD - Primary     * Gus Vidal MD - Resident - Assisting  Anesthesia: General   Estimated blood loss: Less than 50 ml  Drains: None  Specimens: * No specimens in log *  Findings:   plaque along common femoral. endarterectomy completed with bovine patch closure. IR angioplasty popliteal artery..  Complications: None.  Implants:   Implant Name Type Inv. Item Serial No.  Lot No. LRB No. Used   IMP PATCH PERICARDIUM PHOTOFIX BOVINE 0.8X8CM [id number] Bone/Tissue/Biologic IMP PATCH PERICARDIUM PHOTOFIX BOVINE 0.8X8CM [id number] [facility] [id number] Left 1     Advance diet  Vail out when arrives to floor  Pain control, wean from IV as able  Plavix load tonight  ASA in am  Plavix 75 mg in am  Possible discharge in am    Gus Vidal MD

## 2019-11-06 NOTE — ANESTHESIA POSTPROCEDURE EVALUATION
Patient: Alberto Dorsey    Procedure(s):  LEFT COMMON FEMORAL PROXIMAL/ SUPERFICIAL FEMORAL ENDARTERECTOMY WITH BOVINE PATCH.  LEFT POPLITEAL PERCUTANEOUS TRANSLUMINAL ANGIOPLASTY.    Diagnosis:PAD (peripheral artery disease) (H) [I73.9]  Diagnosis Additional Information: No value filed.    Anesthesia Type:  General    Note:  Anesthesia Post Evaluation    Patient location during evaluation: PACU  Patient participation: Able to fully participate in evaluation  Level of consciousness: awake  Pain management: adequate  Airway patency: patent  Cardiovascular status: acceptable  Respiratory status: acceptable  Hydration status: acceptable  PONV: none     Anesthetic complications: None          Last vitals:  Vitals:    11/06/19 1440 11/06/19 1450 11/06/19 1517   BP: 122/65 120/67 129/77   Pulse: 73 70    Resp: 14 15 18   Temp: 35.5  C (95.9  F) 35.6  C (96.1  F)    SpO2: 98% 98% 97%         Electronically Signed By: Adrienne Velasquez  November 6, 2019  3:35 PM

## 2019-11-06 NOTE — CONSULTS
[facility]    Vascular Medicine Consultation     Attestation: I have examined the patient independently of Tracy Soliman PA-C and agree with the examination and plan as delineated below.    Carlos Vail MD      Date of Admission:  11/6/2019  Date of Consult (When I saw the patient): 11/06/19    Assessment & Plan   1. Peripheral arterial disease s/p right common iliac artery stenting and right popliteal artery angioplasty 2007 and left common iliac artery angioplasty/stenting now presenting with worsening bilateral (left > right) lower extremity claudication and undergoing a left common femoral endarterectomy with possible angioplasty of the left popliteal artery 11/6/19     Post-operative cares per Dr. Leonard / Vascular Surgery. He was on aspirin 81 mg daily prior to the procedure. This should be continued. Any additional anti-platelet medication will be per Dr. Leonard. He really needs to stop smoking and he needs to be on a statin (see below).      2. Hyperlipidemia     His lipid panel this admission is significant for an LDL of 57, HDL 55, triglycerides 113, and total cholesterol 135. He is not on any lipid lowering medications. Given his peripheral arterial disease, despite his LDL being at goal of less than 70 without a statin, he should optimally be on a moderate to high dose of a maximally efficacious statin. Atorvastatin 40 mg daily had been prescribed in June, but he never started it. He was encouraged to take this and should continue this indefinitely.      3. Ongoing tobacco use     He has been smoking since the age of 16, now smoking about 5 cigarettes per day. The importance of complete smoking cessation was stressed, particularly in the setting of progressive vascular disease. He has tried quitting with Chantix in the past but stopped this as he developed a rash. He has tried with patches and gum, but was unsuccessful. He was prescribed Wellbutrin to decrease his interest in  smoking along with 7 mg nicotine patches to take away the nicotine urge back in 6/2019, but he never started them. He has been encouraged to at a minimum try nicotine patches to see if he could come off of cigarettes. He states he is willing to try.      4. Asymptomatic bilateral carotid disease     His recent carotid ultrasound was significant for 50-69% stenosis on the left and less than 50% stenosis on the right. This should be followed with an ultrasound on an annual basis, or sooner should he develop any symptoms.       Reason for Consult   Reason for consult: Asked by Dr. Leonard to evaluate vascular risk factors and assist with medical management in this 74 year old male smoker with a history of hypertension, GERD, DONNA and PAD who now presents with worsening lifestyle limiting left lower extremity claudication now undergoing a left femoral endarterectomy with possible left popliteal artery angioplasty.     Primary Care Physician   Ankit Pop      History of Present Illness   Alberto Dorsey is a 74 year old male smoker with a history of hyperlipidemia, hypertension, DONNA, and PAD who underwent stenting of his right common iliac artery and angioplasty of his right popliteal artery in 2007 by Dr. Mackey. He was followed on an annual basis by Interventional Radiology and had been doing well up until the past few months where he began to develop progressive worsening bilateral lower extremity claudication, left worse than right. He had to stop and rest after half a block. He also developed cyanosis in the left great toe. His ABIs were acceptable in 10/2018. However, given his symptoms this was repeated in 6/2019 and left TONYA dropped to 0.51, dropping to 0.14 with exercise and 0.88 on the right, changing to 0.91 after exercise. He was evaluated again by Interventional Radiology and an angiogram was recommended, for which he presented 6/19/19. His left common iliac artery was stented. His symptoms improved some,  but now worsened. He was evaluated by Dr. Leonard of Vascular Surgery and a left femoral endarterectomy with possible angioplasty of the left popliteal artery was recommended. This is to be undertaken today.     Past Medical History   Past Medical History:   Diagnosis Date     Carotid bruit      Gastroesophageal reflux disease      Hypertension      Hyponatremia      Left inguinal hernia      Right inguinal pain      Sleep apnea      Smoker        Past Surgical History   Past Surgical History:   Procedure Laterality Date     ANGIOGRAM       IR LOWER EXTREMITY ANGIOGRAM BILATERAL  6/19/2019     VASCULAR SURGERY  right leg stenting       Prior to Admission Medications   Prior to Admission Medications   Prescriptions Last Dose Informant Patient Reported? Taking?   Ascorbic Acid (VITAMIN C PO) 11/5/2019 at prn Self Yes Yes   Sig: Take 1 tablet by mouth daily as needed   amLODIPine (NORVASC) 10 MG tablet 11/5/2019 at pm Self Yes Yes   Sig: Take 10 mg by mouth every evening    aspirin 81 MG tablet 10/30/2019 Self Yes Yes   Sig: Take 81 mg by mouth daily   hydrALAZINE (APRESOLINE) 25 MG tablet 11/6/2019 at 0530 Self Yes Yes   Sig: Take 25 mg by mouth every morning (in addition to PM dose)   hydrALAZINE (APRESOLINE) 50 MG tablet 11/5/2019 at pm Self Yes Yes   Sig: Take 50 mg by mouth every evening (in addition to AM dose)   ibuprofen (ADVIL/MOTRIN) 200 MG tablet 11/5/2019 at pm Self Yes Yes   Sig: Take 200-400 mg by mouth every evening as needed for mild pain   lisinopril (PRINIVIL/ZESTRIL) 20 MG tablet 11/4/2019 at pm Self Yes Yes   Sig: Take 20 mg by mouth every evening       Facility-Administered Medications: None     Allergies   No Known Allergies    Social History   Alberto Dorsey  reports that he has been smoking cigarettes. He has a 14.40 pack-year smoking history. He has never used smokeless tobacco. He reports current alcohol use. He reports that he does not use drugs.    Family History   Family History   Problem  Relation Age of Onset     Coronary Artery Disease Mother      Coronary Artery Disease Father      Diabetes Sister        Review of Systems   The 10 point Review of Systems is negative other than noted in the HPI or here.     Physical Exam   Temp: 98.1  F (36.7  C) Temp src: Temporal BP: (!) 158/80   Heart Rate: 92 Resp: 16 SpO2: 98 % O2 Device: None (Room air)    Vital Signs with Ranges  Temp:  [98.1  F (36.7  C)] 98.1  F (36.7  C)  Heart Rate:  [92] 92  Resp:  [16] 16  BP: (158)/(80) 158/80  SpO2:  [98 %] 98 %  173 lbs 15.09 oz    Constitutional: awake, alert, cooperative, no apparent distress, and appears stated age  Eyes: Lids and lashes normal, pupils equal, round and reactive to light, extra ocular muscles intact, sclera clear, conjunctiva normal  ENT: normocepalic, without obvious abnormality, oropharynx pink and moist  Hematologic / Lymphatic: no lymphadenopathy  Respiratory: No increased work of breathing, good air exchange, clear to auscultation bilaterally, no crackles or wheezing  Cardiovascular: regular rate and rhythm, normal S1 and S2 and no murmur noted  GI: Normal bowel sounds, soft, non-distended, non-tender  Skin: no redness, warmth, or swelling, no rashes and no lesions  Musculoskeletal: There is no redness, warmth, or swelling of the joints.  Full range of motion noted.  Motor strength is 5 out of 5 all extremities bilaterally.  Tone is normal.  Neurologic: Awake, alert, oriented to name, place and time.  Cranial nerves II-XII are grossly intact.  Motor is 5 out of 5 bilaterally.    Neuropsychiatric:  Normal affect, memory, insight.  Pulses: Palpable right DP pulse. Unable to palpate left pedal pulses. No carotid bruits appreciated.     Data   Most Recent 3 CBC's:  Recent Labs   Lab Test 06/19/19  1030 10/08/15  1150   WBC 6.8 7.1   HGB 12.6* 12.1*   MCV 91 92    252     Most Recent 3 BMP's:  Recent Labs   Lab Test 11/06/19  0853 06/19/19  1030   POTASSIUM 3.5  --    CR 0.90 0.96     Most  Recent 3 INR's:  Recent Labs   Lab Test 06/19/19  1030   INR 0.90     Most Recent Cholesterol Panel:  Recent Labs   Lab Test 11/06/19  0853   CHOL 135   LDL 57   HDL 55   TRIG 113     Most Recent Hemoglobin A1c:  Recent Labs   Lab Test 06/19/19  1030   A1C 5.4

## 2019-11-06 NOTE — DISCHARGE SUMMARY
Vascular Surgery Service Discharge Summary:     NAME: Alberto Dorsey   MRN: [id number]   : 1945   PCP: Ankit Pop    DATE OF ADMISSION: 2019     Procedure/Surgery Information   Procedure: Procedure(s):  LEFT COMMON FEMORAL PROXIMAL/ SUPERFICIAL FEMORAL ENDARTERECTOMY WITH BOVINE PATCH.  LEFT POPLITEAL PERCUTANEOUS TRANSLUMINAL ANGIOPLASTY.   Surgeon(s): Surgeon(s) and Role:     * Chavo Leonard MD - Primary     * Gus Vidal MD - Resident - Assisting   Specimens: * No specimens in log *       PRE/POSTOPERATIVE DIAGNOSES:    Peripheral artery disease    PROCEDURES PERFORMED, 2019:   LEFT COMMON FEMORAL PROXIMAL/ SUPERFICIAL FEMORAL ENDARTERECTOMY WITH BOVINE PATCH.  LEFT POPLITEAL PERCUTANEOUS TRANSLUMINAL ANGIOPLASTY.    INTRAOPERATIVE FINDINGS:   1.  High-grade calcified plaque stenosis of the left common femoral artery.   2.  Focal occlusion of the left mid popliteal artery.     POSTOPERATIVE COMPLICATIONS: None     DATE OF DISCHARGE: 2019    ADMISSION HPI (from 2019): A 74-year-old patient had significant left leg claudication symptoms.  He has undergone prior angiograms with stenting of both common iliac arteries.  However, the left common femoral artery had a nearly occlusive calcified plaque, mild disease at the distal common femoral artery going to the superficial femoral artery.  Profundus femoral arteries patent.  The popliteal artery was small in size and had a focal 1 cm occlusion at the knee joint.  Small distal vessels with a patent anterior tibial artery down to the foot along with a patent peroneal artery, but occluded proximal posterior tibial artery.  We felt that an endarterectomy was indicated due to the calcified plaque and an attempt at recanalization angioplasty of the popliteal artery by Dr. Almaraz intraoperatively.  Risks and benefits were discussed with the patient.     HOSPITAL COURSE: Alberto Dorsey is a 74 year old male who was admitted on  11/6/2019 and underwent the above-named procedures.  He tolerated the procedure well and postoperatively was transferred to the general post-surgical unit. The remainder of his course was essentiallly uncomplicated.He did have some urinary retention once the smith was removed on POD#0. He was able to urinate without low PVR on POD#1. Prior to discharge, his pain was controlled well with oral medications.  he was able to perform ADLs and ambulate independently without difficulty, and had full return of bowel and bladder function.  On 11/7/2019, he was discharged to home in stable condition.     Time Spent on this Encounter   I, Gus Vidal MD, personally saw the patient today and spent greater than 30 minutes discharging this patient.    Physical Exam:  Constitutional: healthy, alert, no acute distress and cooperative   Cardiovascular: RRR without  murmurs, rubs, or gallops noted  Respiratory: breathing unlabored without secondary muscle use  Psychiatric: mentation appears normal and affect normal/bright  : no smith  GI/Abdomen: abdomen soft, non-tender. No palpable masses  MSK: able to move all extremities without weakness or ataxia  Extremities: no open lesions, extremities warm and well perfused. Incision with bandage intact over left groin.  Hematology: no bruising on visible skin  Pulses: present pulses in left DP present    PAST MEDICAL HISTORY:  Past Medical History:   Diagnosis Date     Carotid bruit      Gastroesophageal reflux disease      Hypertension      Hyponatremia      Left inguinal hernia      Right inguinal pain      Sleep apnea      Smoker      PAST SURGICAL HISTORY:  Past Surgical History:   Procedure Laterality Date     ANGIOGRAM       IR LOWER EXTREMITY ANGIOGRAM BILATERAL  6/19/2019     VASCULAR SURGERY  right leg stenting     Labs:  Recent Labs   Lab Test 06/19/19  1030 10/08/15  1150   WBC 6.8 7.1   RBC 3.83* 3.68*   HGB 12.6* 12.1*   HCT 34.9* 33.7*   MCV 91 92   MCH 32.9 32.9   MCHC  36.1 35.9    252     Recent Labs   Lab Test 11/06/19  0853   POTASSIUM 3.5     DISCHARGE INSTRUCTIONS:  Discharge Orders      Reason for your hospital stay    You were in the hospital removal of plaque from your left femoral artery.     Follow-up and recommended labs and tests     Follow-up with Dr Leonard in [facility] in 2 weeks.     Activity    Your activity upon discharge: activity as tolerated     Wound care and dressings    Instructions to care for your wound at home:   May remove outer bandage on 11/8/2019  Steri strips to remains for 10-14 days  Ok to shower     Full Code     Diet    Follow this diet upon discharge: Orders Placed This Encounter      Advance Diet as Tolerated: Regular diet     Discharge Medications   Current Discharge Medication List      START taking these medications    Details   clopidogrel (PLAVIX) 75 MG tablet Take 1 tablet (75 mg) by mouth daily  Qty: 30 tablet, Refills: 2    Associated Diagnoses: PAD (peripheral artery disease) (H)         CONTINUE these medications which have NOT CHANGED    Details   amLODIPine (NORVASC) 10 MG tablet Take 10 mg by mouth every evening       Ascorbic Acid (VITAMIN C PO) Take 1 tablet by mouth daily as needed      aspirin 81 MG tablet Take 81 mg by mouth daily      !! hydrALAZINE (APRESOLINE) 25 MG tablet Take 25 mg by mouth every morning (in addition to PM dose)      !! hydrALAZINE (APRESOLINE) 50 MG tablet Take 50 mg by mouth every evening (in addition to AM dose)      lisinopril (PRINIVIL/ZESTRIL) 20 MG tablet Take 20 mg by mouth every evening        !! - Potential duplicate medications found. Please discuss with provider.      STOP taking these medications       ibuprofen (ADVIL/MOTRIN) 200 MG tablet Comments:   Reason for Stopping:             Allergies   No Known Allergies    Gus Lumbard  Surgery PGY4  Division of Vascular Surgery     STAFF:  As above.  +3 left DP pulse.  On ASA/Plavix due to endarterectomy and popliteal  angioplasty.  Instructed in cares.  RTO 2 weeks.    Chavo Leonard MD

## 2019-11-06 NOTE — PROGRESS NOTES
Admission medication history interview status for the 11/6/2019  admission is complete. See EPIC admission navigator for prior to admission medications     Medication history source reliability:Good    Medication history interview source(s):Patient    Medication history resources (including written lists, pill bottles, clinic record):None    Primary pharmacy.[facility]    Additional medication history information not noted on PTA med list :None    Time spent in this activity: 35 minutes    Prior to Admission medications    Medication Sig Last Dose Taking? Auth Provider   amLODIPine (NORVASC) 10 MG tablet Take 10 mg by mouth every evening  11/5/2019 at pm Yes Reported, Patient   Ascorbic Acid (VITAMIN C PO) Take 1 tablet by mouth daily as needed 11/5/2019 at prn Yes Reported, Patient   aspirin 81 MG tablet Take 81 mg by mouth daily 10/30/2019 Yes Reported, Patient   hydrALAZINE (APRESOLINE) 25 MG tablet Take 25 mg by mouth every morning (in addition to PM dose) 11/6/2019 at 0530 Yes Reported, Patient   hydrALAZINE (APRESOLINE) 50 MG tablet Take 50 mg by mouth every evening (in addition to AM dose) 11/5/2019 at pm Yes Reported, Patient   ibuprofen (ADVIL/MOTRIN) 200 MG tablet Take 200-400 mg by mouth every evening as needed for mild pain 11/5/2019 at pm Yes Reported, Patient   lisinopril (PRINIVIL/ZESTRIL) 20 MG tablet Take 20 mg by mouth every evening  11/4/2019 at pm Yes Reported, Patient

## 2019-11-06 NOTE — PROGRESS NOTES
Vascular Surgery Progress Note:  POST OP CHECK    S: Very comfortable.    O: Vail out  Vitals:  BP  Min: 114/69  Max: 158/80  Temp  Av  F (36.1  C)  Min: 94.8  F (34.9  C)  Max: 98.1  F (36.7  C)  Pulse  Av.9  Min: 68  Max: 82  I/O last 3 completed shifts:  In: 1850 [I.V.:1850]  Out: 420 [Urine:400; Blood:20]    Physical Exam: Comfortable.                             Wds=A                              +3 palpable right and left DP pulses      LDL=57      Assessment/Plan: Doing well.   ASA/Plavix.      Wm. MD Aisha

## 2019-11-06 NOTE — ANESTHESIA PREPROCEDURE EVALUATION
Anesthesia Pre-Procedure Evaluation    Patient: Alberto Dorsey   MRN: [id number] : 1945          Preoperative Diagnosis: PAD (peripheral artery disease) (H) [I73.9]    Procedure(s):  LEFT COMMON FEMORAL ENDARTERECTOMY AND PATCH.  LEFT POPLITEAL PERCUTANEOUS TRANSLUMINAL ANGIOPLASTY.    Past Medical History:   Diagnosis Date     Carotid bruit      Gastroesophageal reflux disease      Hypertension      Hyponatremia      Left inguinal hernia      Right inguinal pain      Sleep apnea      Smoker      Past Surgical History:   Procedure Laterality Date     ANGIOGRAM       IR LOWER EXTREMITY ANGIOGRAM BILATERAL  2019     VASCULAR SURGERY  right leg stenting       Anesthesia Evaluation     .             ROS/MED HX    ENT/Pulmonary:     (+)sleep apnea, doesn't use CPAP , . .   (-) tobacco use and asthma   Neurologic:      (-) seizures and CVA   Cardiovascular:     (+) hypertension----. : . . . :. .       METS/Exercise Tolerance:     Hematologic:         Musculoskeletal:         GI/Hepatic:     (+) GERD Asymptomatic on medication,       Renal/Genitourinary:         Endo:      (-) Type II DM and thyroid disease   Psychiatric:         Infectious Disease:         Malignancy:         Other:                          Physical Exam      Airway   Mallampati: I  TM distance: >3 FB  Neck ROM: full    Dental   (+) missing    Cardiovascular   Rhythm and rate: regular and normal      Pulmonary    breath sounds clear to auscultation            Lab Results   Component Value Date    WBC 6.8 2019    HGB 12.6 (L) 2019    HCT 34.9 (L) 2019     2019    CRP <2.9 10/08/2015    SED 9 10/08/2015    POTASSIUM 3.5 2019    BUN 21 2007    CR 0.90 2019    PTT 30 2019    INR 0.90 2019       Preop Vitals  BP Readings from Last 3 Encounters:   19 (!) 158/80   10/03/19 (!) 154/77   19 122/65    Pulse Readings from Last 3 Encounters:   10/03/19 88   19 87   19 82  "     Resp Readings from Last 3 Encounters:   11/06/19 16   06/19/19 18    SpO2 Readings from Last 3 Encounters:   11/06/19 98%   06/19/19 95%   10/28/15 98%      Temp Readings from Last 1 Encounters:   11/06/19 36.7  C (98.1  F) (Temporal)    Ht Readings from Last 1 Encounters:   11/06/19 1.854 m (6' 1\")      Wt Readings from Last 1 Encounters:   11/06/19 78.9 kg (173 lb 15.1 oz)    Estimated body mass index is 22.95 kg/m  as calculated from the following:    Height as of this encounter: 1.854 m (6' 1\").    Weight as of this encounter: 78.9 kg (173 lb 15.1 oz).       Anesthesia Plan      History & Physical Review  History and physical reviewed and following examination; no interval change.    ASA Status:  2 .        Plan for General with Intravenous induction. Maintenance will be Balanced.    PONV prophylaxis:  Ondansetron (or other 5HT-3) and Dexamethasone or Solumedrol  2nd IV  Discussed floridalma as needed      Postoperative Care  Postoperative pain management:  IV analgesics and Oral pain medications.      Consents  Anesthetic plan, risks, benefits and alternatives discussed with:  Patient..                 Adrienne Velasquez  "

## 2019-11-06 NOTE — OP NOTE
Procedure Date: 11/06/2019      PREOPERATIVE DIAGNOSES:   1.  High-grade calcified plaque stenosis of the left common femoral artery.   2.  Focal occlusion of the left mid popliteal artery.      POSTOPERATIVE DIAGNOSES:     1.  High-grade calcified plaque stenosis of the left common femoral artery.   2.  Focal occlusion of the left mid popliteal artery.      PROCEDURES PERFORMED:     1.  Left common femoral/proximal superficial femoral endarterectomy with bovine patch angioplasty.   2.  Left leg angiogram with angioplasty of left mid popliteal artery stenosis.                                 (Dr. Marcelle Almaraz).       SURGEON:  Chavo Leonard MD.      :  Dr. Derek Lombard ([facility] surgery resident) and Dr. Marcelle Almaraz (Interventional Radiology).      ANESTHESIA:  General.      PREOPERATIVE MEDICATIONS:  Ancef 2 grams IV.      INDICATIONS:  A 74-year-old patient had significant left leg claudication symptoms.  He has undergone prior angiograms with stenting of both common iliac arteries.  However, the left common femoral artery had a nearly occlusive calcified plaque, mild disease at the distal common femoral artery going to the superficial femoral artery.  Profundus femoral arteries patent.  The popliteal artery was small in size and had a focal 1 cm occlusion at the knee joint.  Small distal vessels with a patent anterior tibial artery down to the foot along with a patent peroneal artery, but occluded proximal posterior tibial artery.  We felt that an endarterectomy was indicated due to the calcified plaque and an attempt at recanalization angioplasty of the popliteal artery by Dr. Almaraz intraoperatively.  Risks and benefits were discussed with the patient.      DESCRIPTION OF PROCEDURE:  The patient was brought to the operating room, induced under general anesthesia, orally intubated without difficulty.  Vail catheter was placed.  Calf pneumatic compression boot was placed on the right  leg with a palpable dorsalis pedis pulse being noted and appropriate padding.  The entire left leg and groin were prepped and draped.  Timeout was called and sites were identified.        VASCULAR EXPOSURE:  An oblique incision was made in the left groin in this thin patient.  Dissection was carried down to identify the femoral arteries.  A small branch off the greater saphenous vein was preserved distally.  We had identified several crossing of veins over the common femoral artery and these were divided between 2-0 silk sutures.  We dissected down to the common femoral artery which was markedly calcified.  We dissected under the inguinal ligament to encircle the lateral femoral circumflex artery.  Epigastric artery was not identified.  We did find a relatively soft area at the very distal external left artery where a clamp could be placed.  Superficial femoral artery disease at its origin, but slightly softer 1.5 cm distally where it was encircled with vessel loops.  Profundus femoral artery had only mild disease and this was likewise encircled.      COMMON FEMORAL/SUPERFICIAL FEMORAL ENDARTERECTOMY:  Heparin intravenous 5000 units were given.  After 5 minutes, a vascular clamp was applied to the distal external iliac artery and the vessel loops distally.  We had a soft area on the proximal superficial femoral artery and this was opened with an 11 blade.  Steele scissors extended the arteriotomy to the proximal common femoral artery.  A nearly occlusive calcified plaque was found in the mid common femoral artery.  Mild wall thickening was noted on the proximal superficial femoral artery and the origin anteriorly of the profundus femoral artery, but the rest of the profundus femoral artery was free of disease.  With the aid of loupe magnification, we performed a partial endarterectomy, excising the calcified plaque.  We made endpoints on the distal common femoral artery, so we would not have the dissection into the  profundus femoral or superficial femoral artery, which had only mild disease.  Endarterectomy was continued up to the distal external iliac artery where we had a fairly good endpoint, but obviously some remaining posterior wall calcification, but not affecting the inflow.  Loose medial fibers were removed and there were no flaps.      PHOTOFIXED BOVINE PATCH ANGIOPLASTY:  We then selected a bovine graft.  This was sewn from the origin of our arteriotomy in the distal external iliac artery down to the superficial femoral artery 1.2 cm from its origin with running 6-0 Prolene suture and loupe magnification.  We removed a small amount of plaque on the anterior border of the profundus femoral artery.  Prior to complete closure, a 6 Yi sheath with a wire was passed under direct visualization down the superficial femoral artery from the distal common femoral artery.  We finished our suture line so we could place a rubber shod on the suture line holding the catheter in place and preventing bleeding.  We flushed the artery.  A vessel loop was placed on the proximal superficial femoral artery and the vascular clamp and vessel loops were released with a strong pulse being noted and good flow into the profundus femoral artery with good hemostasis.      POPLITEAL ANGIOPLASTY:  Dr. Almaraz then came to the room.  The 7 Yi was heparinized, along with an additional 3000 units of intravenous heparin.  She was able to pass her wire beyond the occluded area down into the peroneal artery and successfully angioplastied up to a 4 mm balloon, the stenosis.  Final runoffs revealed a good result with no significant narrowing, good flow into the anterior tibial and peroneal arteries.      Sheath and catheter were removed.  We then closed our patch with 6-0 Prolene suture.  We did place several mattress 6-0 Prolene sutures on our patch site for good hemostasis with an excellent pulse noted in the profundus femoral and superficial  femoral artery.  Good Doppler signals in the dorsalis pedis artery in the foot, though the pulse was not palpable probably due to the very small size of the artery.  Signal also noted in the posterior tibial artery at the level of the ankle from collaterals.      We had a very dry field.  Our patch measured 6 cm in length.  A small amount of Surgicel was placed over this.  The groin was closed in layers with interrupted 3-0 and 2-0 Vicryl suture and skin was closed with 4-0 Monocryl in a subcuticular fashion.  Wounds were infiltrated with 0.5% Marcaine for postoperative analgesia.  Gauze dressing applied.      The patient tolerated the procedure well.      ESTIMATED BLOOD LOSS:  Less than 20 mL.      COMPLICATIONS:  None.      The patient will be given 300 mg of Plavix in recovery due to the angioplasty of the very small popliteal artery and our open vascular procedure.      I was involved in all portions of the procedure including aiding Dr. Almaraz in the angioplasty and angiogram portions of the procedure.         YOVANY VAUGHAN MD             D: 2019   T: 2019   MT:       Name:     DINO LIMA   MRN:      [id number]        Account:        [id number]   :      1945           Procedure Date: 2019      Document: [id number]       cc: Yovany Vaughan MD

## 2019-11-06 NOTE — ADDENDUM NOTE
Addendum  created 11/06/19 1715 by Anthony Villanueva MD    Intraprocedure Event edited, Intraprocedure Staff edited

## 2019-11-07 VITALS
HEART RATE: 76 BPM | HEIGHT: 73 IN | OXYGEN SATURATION: 96 % | RESPIRATION RATE: 18 BRPM | WEIGHT: 166.23 LBS | SYSTOLIC BLOOD PRESSURE: 134 MMHG | TEMPERATURE: 97.5 F | BODY MASS INDEX: 22.03 KG/M2 | DIASTOLIC BLOOD PRESSURE: 77 MMHG

## 2019-11-07 LAB
ANION GAP SERPL CALCULATED.3IONS-SCNC: 5 MMOL/L (ref 3–14)
BASOPHILS # BLD AUTO: 0 10E9/L (ref 0–0.2)
BASOPHILS NFR BLD AUTO: 0.2 %
BUN SERPL-MCNC: 9 MG/DL (ref 7–30)
CALCIUM SERPL-MCNC: 8.6 MG/DL (ref 8.5–10.1)
CHLORIDE SERPL-SCNC: 104 MMOL/L (ref 94–109)
CO2 SERPL-SCNC: 25 MMOL/L (ref 20–32)
CREAT SERPL-MCNC: 0.84 MG/DL (ref 0.66–1.25)
DIFFERENTIAL METHOD BLD: ABNORMAL
EOSINOPHIL # BLD AUTO: 0 10E9/L (ref 0–0.7)
EOSINOPHIL NFR BLD AUTO: 0.1 %
ERYTHROCYTE [DISTWIDTH] IN BLOOD BY AUTOMATED COUNT: 13.6 % (ref 10–15)
GFR SERPL CREATININE-BSD FRML MDRD: 87 ML/MIN/{1.73_M2}
GLUCOSE BLDC GLUCOMTR-MCNC: 120 MG/DL (ref 70–99)
GLUCOSE SERPL-MCNC: 115 MG/DL (ref 70–99)
HCT VFR BLD AUTO: 33 % (ref 40–53)
HGB BLD-MCNC: 11.2 G/DL (ref 13.3–17.7)
IMM GRANULOCYTES # BLD: 0 10E9/L (ref 0–0.4)
IMM GRANULOCYTES NFR BLD: 0.1 %
INTERPRETATION ECG - MUSE: NORMAL
LYMPHOCYTES # BLD AUTO: 1.4 10E9/L (ref 0.8–5.3)
LYMPHOCYTES NFR BLD AUTO: 16.1 %
MCH RBC QN AUTO: 32.4 PG (ref 26.5–33)
MCHC RBC AUTO-ENTMCNC: 33.9 G/DL (ref 31.5–36.5)
MCV RBC AUTO: 95 FL (ref 78–100)
MONOCYTES # BLD AUTO: 0.8 10E9/L (ref 0–1.3)
MONOCYTES NFR BLD AUTO: 9.7 %
NEUTROPHILS # BLD AUTO: 6.3 10E9/L (ref 1.6–8.3)
NEUTROPHILS NFR BLD AUTO: 73.8 %
NRBC # BLD AUTO: 0 10*3/UL
NRBC BLD AUTO-RTO: 0 /100
PLATELET # BLD AUTO: 249 10E9/L (ref 150–450)
POTASSIUM SERPL-SCNC: 4.1 MMOL/L (ref 3.4–5.3)
RBC # BLD AUTO: 3.46 10E12/L (ref 4.4–5.9)
SODIUM SERPL-SCNC: 134 MMOL/L (ref 133–144)
WBC # BLD AUTO: 8.6 10E9/L (ref 4–11)

## 2019-11-07 PROCEDURE — 00000146 ZZHCL STATISTIC GLUCOSE BY METER IP

## 2019-11-07 PROCEDURE — 85025 COMPLETE CBC W/AUTO DIFF WBC: CPT | Performed by: STUDENT IN AN ORGANIZED HEALTH CARE EDUCATION/TRAINING PROGRAM

## 2019-11-07 PROCEDURE — 36415 COLL VENOUS BLD VENIPUNCTURE: CPT | Performed by: STUDENT IN AN ORGANIZED HEALTH CARE EDUCATION/TRAINING PROGRAM

## 2019-11-07 PROCEDURE — 25000132 ZZH RX MED GY IP 250 OP 250 PS 637: Performed by: STUDENT IN AN ORGANIZED HEALTH CARE EDUCATION/TRAINING PROGRAM

## 2019-11-07 PROCEDURE — 25000128 H RX IP 250 OP 636: Performed by: STUDENT IN AN ORGANIZED HEALTH CARE EDUCATION/TRAINING PROGRAM

## 2019-11-07 PROCEDURE — 80048 BASIC METABOLIC PNL TOTAL CA: CPT | Performed by: STUDENT IN AN ORGANIZED HEALTH CARE EDUCATION/TRAINING PROGRAM

## 2019-11-07 PROCEDURE — 99233 SBSQ HOSP IP/OBS HIGH 50: CPT | Performed by: INTERNAL MEDICINE

## 2019-11-07 RX ORDER — CLOPIDOGREL BISULFATE 75 MG/1
75 TABLET ORAL DAILY
Qty: 30 TABLET | Refills: 2 | Status: SHIPPED | OUTPATIENT
Start: 2019-11-07 | End: 2019-11-21

## 2019-11-07 RX ADMIN — CEFAZOLIN 1 G: 1 INJECTION, POWDER, FOR SOLUTION INTRAMUSCULAR; INTRAVENOUS at 04:49

## 2019-11-07 RX ADMIN — CLOPIDOGREL BISULFATE 75 MG: 75 TABLET ORAL at 08:19

## 2019-11-07 RX ADMIN — SENNOSIDES AND DOCUSATE SODIUM 2 TABLET: 8.6; 5 TABLET ORAL at 08:18

## 2019-11-07 RX ADMIN — HYDRALAZINE HYDROCHLORIDE 25 MG: 25 TABLET ORAL at 08:19

## 2019-11-07 ASSESSMENT — ACTIVITIES OF DAILY LIVING (ADL)
ADLS_ACUITY_SCORE: 11

## 2019-11-07 ASSESSMENT — MIFFLIN-ST. JEOR: SCORE: 1557.88

## 2019-11-07 NOTE — PLAN OF CARE
Pt is A+Ox4, VSS. Pt is up independently. Planning to discharge today. Pt is retaining urine. Straight cathed at 0300 for 700ml. Was only able to void dribbles. Pulses palpable, left post tib is +1, the rest are +2. No numbness or tingling. Full sensation. Denies pain. Lung sounds clear. Passing gas.

## 2019-11-07 NOTE — PROVIDER NOTIFICATION
"Page out to vascular surgery \"Pt voided 175ml and bladder scan was only 44ml. Ok for discharge order?\"  Awaiting new order  "

## 2019-11-07 NOTE — PROGRESS NOTES
Vascular Surgery Progress Note:  POD#1    S: Very comfortable.    O: Needed straight cath for 700 mL this morning.  Does have a history of nocturia at home.  Vitals:  BP  Min: 114/69  Max: 158/80  Temp  Av.2  F (36.2  C)  Min: 94.8  F (34.9  C)  Max: 98.3  F (36.8  C)  Pulse  Av.3  Min: 68  Max: 92  I/O last 3 completed shifts:  In: 2916 [P.O.:1060; I.V.:1856]  Out: 1400 [Urine:1380; Blood:20]    Physical Exam: Alert and appropriate.  No incisional pain.     +3 palpable dorsalis pedis pulses bilaterally.                             Left groin   Wound=A      Assessment/Plan: #1.  Doing very well following the left common femoral/superficial femoral enterectomy with bovine patch angioplasty along with angioplasty of short distance occlusion of the left mid popliteal artery.  Good flow to the foot primarily to the anterior tibial/dorsalis pedis artery and also peroneal artery and reconstituted posterior tibial artery.  Will place on aspirin and Plavix with the latter for minimum of 3 months.                                   #2.   Acute urinary retention requiring straight cath postoperatively.   Will need to void prior to discharge.  May need Flomax.                                     #3.  LDL at goal less than 70 .  He did not start his Lipitor when recommended.we will initiate Lipitor due to pan systemic vascular issues.       Return office in 2 weeks.  Duplex of left popliteal artery in 3 months.                            Wm. Aisha MD

## 2019-11-07 NOTE — PLAN OF CARE
Discharge instructions given, reviewed, and questions answered.  Pt discharged in stable condition in wheelchair, via personal transportation.

## 2019-11-07 NOTE — PROGRESS NOTES
[facility]    Vascular Medicine Progress Note    Date of Service (when I saw the patient): 11/07/2019          Physician Supervisory Attestation:   I have reviewed and discussed with the physician assistant their history, physical and plan and independently interviewed and examined Alberto Dorsey and agree with the plan as stated in the physician assistant note.    Doing well, ambulating, voided , pain well controlled.  Normal renal Fx, HGB stable.  On ASA and plavix   atorva 40 daily  Quit smoking ( see  Below)     Follow up with DR. Leonard , post op check.    Patient care time spent 35 minutes today     Oh Robles MD , [facility], [facility]  Vascular Medicine  11/7/2019           Assessment & Plan   1. Peripheral arterial disease s/p right common iliac artery stenting and right popliteal artery angioplasty 2007 and left common iliac artery angioplasty/stenting now presenting with worsening bilateral (left > right) lower extremity claudication and s/p left common femoral and proximal SFA endarterectomy with angioplasty of the left popliteal artery 11/6/19     Post-operative cares and follow up per Dr. Leonard / Vascular Surgery. Continue aspirin 81 mg daily and started on Plavix. Home today. He really needs to stop smoking and he needs to be on a statin (see below).      2. Hyperlipidemia     His lipid panel this admission is significant for an LDL of 57, HDL 55, triglycerides 113, and total cholesterol 135. He is not on any lipid lowering medications. Given his peripheral arterial disease, despite his LDL being at goal of less than 70 without a statin, he should optimally be on a moderate to high dose of a maximally efficacious statin. Atorvastatin 40 mg daily had been prescribed in June, but he never started it. He was encouraged to take this and should continue this indefinitely. He has his prescription at home and states he will start this after discharge.      3. Ongoing tobacco use     He  has been smoking since the age of 16, now smoking about 5 cigarettes per day. The importance of complete smoking cessation was stressed, particularly in the setting of progressive vascular disease. He has tried quitting with Chantix in the past but stopped this as he developed a rash. He has tried with patches and gum, but was unsuccessful. He was prescribed Wellbutrin to decrease his interest in smoking along with 7 mg nicotine patches to take away the nicotine urge back in 6/2019, but he never started them. He has been encouraged to at a minimum try nicotine patches to see if he could come off of cigarettes. He states he is willing to try and has the at home already.       4. Asymptomatic bilateral carotid disease     His recent carotid ultrasound was significant for 50-69% stenosis on the left and less than 50% stenosis on the right. This should be followed with an ultrasound on an annual basis, or sooner should he develop any symptoms.     Interval History   Doing well. Pain controlled. Ambulating. Eating. Voiding. Ready to go home.     Physical Exam   Temp: 97.5  F (36.4  C) Temp src: Oral BP: 134/77 Pulse: 76 Heart Rate: 80 Resp: 18 SpO2: 96 % O2 Device: None (Room air) Oxygen Delivery: 1 LPM  Vitals:    11/06/19 0858 11/07/19 0500   Weight: 78.9 kg (173 lb 15.1 oz) 75.4 kg (166 lb 3.6 oz)     Vital Signs with Ranges  Temp:  [94.8  F (34.9  C)-98.3  F (36.8  C)] 97.5  F (36.4  C)  Pulse:  [68-92] 76  Heart Rate:  [66-88] 80  Resp:  [11-24] 18  BP: (114-146)/(65-84) 134/77  SpO2:  [96 %-100 %] 96 %  I/O last 3 completed shifts:  In: 2916 [P.O.:1060; I.V.:1856]  Out: 1400 [Urine:1380; Blood:20]    Constitutional: Awake, alert, cooperative, no apparent distress, and appears stated age.  Eyes: Lids and lashes normal, sclera clear, conjunctiva normal.  ENT: Normocephalic, without obvious abnormality, atraumatic, oral pharynx with moist mucus membranes  Respiratory: No increased work of breathing, good air exchange,  clear to auscultation bilaterally, no crackles or wheezing.  Cardiovascular: Regular rate and rhythm, normal S1 and S2, no S3 or S4, and no murmur noted.  GI: Normal bowel sounds, soft, non-distended, non-tender  Skin: No bruising or bleeding, normal skin color, texture, turgor, no redness, warmth, or swelling, no rashes, surgical sites c/d/i.   Musculoskeletal: There is no redness, warmth, or swelling of the joints.    Neurologic: Awake, alert, oriented to name, place and time.  Cranial nerves II-XII are grossly intact.    Neuropsychiatric: Calm, normal eye contact, alert, normal affect, oriented to self, place, time and situation, memory for past and recent events intact and thought process normal.    Medications     nitroPRUsside (NIPRIDE) IV infusion ADULT/PEDS GREATER than or EQUAL to 45 kg std conc         amLODIPine  10 mg Oral QPM     aspirin  81 mg Oral Daily     clopidogrel  75 mg Oral Daily     hydrALAZINE  25 mg Oral QAM     senna-docusate  1 tablet Oral BID    Or     senna-docusate  2 tablet Oral BID     sodium chloride (PF)  3 mL Intracatheter Q8H       Data   Recent Labs   Lab 11/07/19  0721 11/06/19  0853   WBC 8.6  --    HGB 11.2*  --    MCV 95  --      --      --    POTASSIUM 4.1 3.5   CHLORIDE 104  --    CO2 25  --    BUN 9  --    CR 0.84 0.90   ANIONGAP 5  --    AIDA 8.6  --    *  --      No results found for this or any previous visit (from the past 24 hour(s)).

## 2019-11-07 NOTE — DISCHARGE INSTRUCTIONS
Discharge Instructions following Arterial Bypass Surgery  [facility]    The surgeon who performed your surgery is: _____________________________________  Incision Care    The length and number of incisions you have will vary depending on the exact type of arterial bypass you require.  You may have incisions on your  good  leg as well, if that happens to be the leg with the  best vein  for the bypass graft.      You may have staples holding your incisions closed, and possibly sutures in other incisions, especially if near the ankle or foot.  These will be removed at your 2 week post-operative appointment.  Once they are removed, you may have white strips of tape, called steri-strips, applied over the incision.  These will curl up on the ends after 5-7 days, at which time they can be removed.      Leg swelling is common after surgery and must be controlled by elevating your legs above your heart.  Propping your legs up on a stool or sitting in a recliner chair will not relieve the swelling, but it will prevent it from getting worse.  Avoid hanging your leg in a dependant position.  For the first few weeks after surgery, you will need to really work at controlling this swelling, and will likely need to spend a fair amount of time with your leg elevated.  Eventually, you should be able to manage the swelling by elevating 3-4 times a day for 20-30 minutes.  If the swelling does not decrease after elevating your legs above your heart for at least 2 hours, you should call our office.      If you have a groin incision, you should keep gauze tucked in the skin fold to prevent the skin on skin contact.  This will prevent a moist environment which hinders healing.  Call our office to discuss this further if this area looks red or has an odor.      If you need to use a dressing over your incision, avoid using tape on the surrounding skin.  It is best to wrap with roll gauze, such as  Kerlix.  Be careful not to wrap tightly, just enough to hold the gauze in place.       You may shower 2-3 days after your surgery - pat the incision dry to prevent irritation from moisture.  You do not need to cover with a bandage unless you have drainage.      You may develop a bruising and firmness near your incision.  This will soften and resolve over the next 1-3 weeks.  Call our office if it enlarges, becomes red, or painful.      On occasion a soft, fluid-filled bulge can develop near a surgical incision - this is called a seroma.  It will likely resolve on its own, but occasionally requires your doctor to drain it in clinic.  You should call our office if you have questions about this.      You may notice numbness around your incision.  This is due to nerve irritation from the surgery and will gradually improve over the next several weeks.      Activity    Walking is important after surgery.  You will begin walking before you leave the hospital, and then you should gradually increase your distance as tolerated.  You should be taking at least 3-4 short walks a day if leg swelling is not a problem.      You can climb stairs when you feel strong enough.      You should not drive for 1-2 weeks.  In addition, you can not be taking prescription strength pain medication and you must feel strong enough to drive safely.      You may return to work once you feel ready - this can be decided at your 2 week post-operative visit.      You should avoid strenuous activity, including running, biking, or weight-lifting until discussed at your post-operative appointment.  Diet    You may resume a regular diet before you leave the hospital.  You may find that it is best to try smaller, more frequent meals until your appetite returns.    Medications    You may be started on a blood thinner after surgery.  If you are taking Coumadin, you need to have your blood monitored regularly.      You may be given a prescription for pain  medication after surgery.  If you need to have this refilled, please call your pharmacy where you had it filled.  They will then call us for approval.  Please do not call after hours for a refill on pain medication.    Post-Operative Appointments    You will need to see your doctor in clinic 10-14 days after surgery.        You will then be monitored regularly with an Ultrasound test to assure that the bypass graft is functioning properly.  Typically for the first year, you will have this test and appointment with your doctor every 3-6 months.  Thereafter, the frequency varies, but is generally every 6-12 months.  We will notify you by mail when you are due for these appointments.    When to Call our Office    Temperature greater than 101.      When you are unable to feel a pulse in your foot or leg, when you have been monitoring regularly.      If you notice new onset of numbness, tingling, coolness or pain in your leg or foot.      Severe pain, especially if it is new and preventing sleep.      If you will be having an invasive procedure, such as a colonoscopy or dental work, within one year of your surgery, you may need to take an antibiotic prior to the procedure if you had an artificial graft placed with your surgery; please call us so we can assist you with this.      Call our main number, [phone], for assistance with any concerns or questions.     Revised 5/2014

## 2019-11-07 NOTE — PLAN OF CARE
POD#0. A/ox4. Tolerating full liquid diet. Denies pain. Dressing CDI. CMS intact. Pulses +2 palpable. VSS, weaning O2, still on 1 liter. Discharge pending progress.

## 2019-11-20 NOTE — PROGRESS NOTES
[place] [facility]ASC[facility]    Alberto Dorsey returns for vascular follow-up.  He had significant left leg claudication symptoms.  He underwent bilateral common iliac artery angioplasty and stenting.  Nearly occlusive left common femoral plaque was noted.  Along with a mid focal popliteal stenosis was noted.    On 11/6/2019 the left common femoral/proximal superficial enterectomy and bovine patch was placed along with interoperative angioplasty of the left mid popliteal artery stenosis.  +3 dorsalis pedis pulse noted following this.  Placed on aspirin and Plavix.    LDL=57 A1c= 5.4    He has done very well following the surgery.  He is walking with no difficulty at all.  No left groin incisional pain.  Required no pain medications at all following the surgery.    Exam: Alert and appropriate.             Blood pressure 135/73.  Pulse 77.             Left groin incision is healing well.  No hematoma or swelling.             +3 palpable left dorsalis pedis pulse.             +3 triphasic left dorsalis pedis and +3 biphasic posterior tibial Doppler    He still smoking but down to 2 cigarettes daily which is a marked improvement.  Still would like to quit completely.    Impression: Doing very well following left femoralSFA endarterectomy with bovine patch angioplasty and angioplasty of the focal stenosis of the popliteal artery.  Excellent pulses and Doppler signals noted today.  We will plan to keep him on aspirin and Plavix.  I will see him back in 3 months for a duplex of the left femoral and popliteal arteries along with an TONYA with exercise.           Still encouraged to quit smoking and he will work on this.       Chavo Leonard MD     CC  Patient Care Team:  Ankit Pop MD as PCP - General (Family Practice)

## 2019-11-21 ENCOUNTER — OFFICE VISIT (OUTPATIENT)
Dept: OTHER | Facility: CLINIC | Age: 74
End: 2019-11-21
Attending: SURGERY
Payer: COMMERCIAL

## 2019-11-21 VITALS — SYSTOLIC BLOOD PRESSURE: 135 MMHG | TEMPERATURE: 98 F | HEART RATE: 77 BPM | DIASTOLIC BLOOD PRESSURE: 73 MMHG

## 2019-11-21 DIAGNOSIS — I73.9 PAD (PERIPHERAL ARTERY DISEASE) (H): ICD-10-CM

## 2019-11-21 DIAGNOSIS — I73.9 PAD (PERIPHERAL ARTERY DISEASE) (H): Primary | ICD-10-CM

## 2019-11-21 PROCEDURE — G0463 HOSPITAL OUTPT CLINIC VISIT: HCPCS

## 2019-11-21 PROCEDURE — 99024 POSTOP FOLLOW-UP VISIT: CPT | Mod: ZP | Performed by: SURGERY

## 2019-11-21 RX ORDER — CLOPIDOGREL BISULFATE 75 MG/1
75 TABLET ORAL DAILY
Qty: 90 TABLET | Refills: 1 | Status: SHIPPED | OUTPATIENT
Start: 2019-11-21

## 2019-11-21 NOTE — NURSING NOTE
"Alberto Dorsey is a 74 year old male who presents for:  Chief Complaint   Patient presents with     RECHECK     1st po LEFT COMMON FEMORAL ENDARTERECTOMY AND PATCH, LEFT POPLITEAL PTA on 11/06/19.        Vitals:    Vitals:    11/21/19 1600   BP: 135/73   BP Location: Right arm   Patient Position: Chair   Cuff Size: Adult Regular   Pulse: 77   Temp: 98  F (36.7  C)   TempSrc: Oral       BMI:  Estimated body mass index is 21.93 kg/m  as calculated from the following:    Height as of 11/6/19: 6' 1\" (1.854 m).    Weight as of 11/7/19: 166 lb 3.6 oz (75.4 kg).    Pain Score:  Data Unavailable        Kecia Perez MA    "

## 2020-02-19 NOTE — PROGRESS NOTES
[place] VASC[facility]    Alberto Dorsey returns for follow-up of his bypass graft.  This 74-year-old patient had undergone bilateral common iliac artery angioplasty and stenting.  This was followed by a left common femoral /proximal SFA enterectomy and bovine patch along with interoperative SFA angioplasty on 2019.  +3 dorsalis pedis pulse following this.  He is noted complete resolution of his left leg claudication symptoms following the intervention.      PMH: Medications: Lisinopril, Norvasc, Apresoline, aspirin, Plavix             Ongoing occasional smoking.    Unfortunately his wife  approximately 10 days ago and this is been very difficult for him.      Exam: Alert and appropriate.  Very tearful with his wife's passing.             Blood pressure 158/70.  Pulse 90.             Chest= clear             Extremities= no edema.  Normal sensation.                 +3 palpable dorsalis pedis pulses bilaterally.      Left arterial duplex reveals widely patent common femoral endarterectomy site.  The SFA is patent as is the popliteal artery with a diameter of 3.1 mm in good flow.  Calcification is noted which is consistent with our angiographic findings at the time of the intervention.      Ankle-brachial index is 0.901 right with triphasic waveforms decreasing to 0.77 with exercise.  On the left this is 1.06 with triphasic/biphasic waveforms slightly decreasing to 0.99.  He was asymptomatic.    Impression: #1.  Widely patent left common femoral/proximal SFA endarterectomy and vein patch site.  Patent angioplasty of the distal SFA and popliteal artery though calcified.  Very adequate runoff with palpable dorsalis pedis pulse in the left.  Very slight decrease in TONYA with exercise.                          #2.  Mild asymptomatic right PAD.                        #3.   Very mild left carotid bifurcation disease on duplex on 2019.    Most important is risk factor control.  Still occasionally smokes  and will work on quitting.  Does have longer nails and discussed the importance of good foot care.  He has very adequate shoes.  LDL at the time of intervention= 57 with an A1c= 5.4 (both excellent).    We will see him again in 1 year for a follow-up duplex of his left leg arteries and TONYA with exercise.    25 minutes with the patient today with over 50% in counseling.       Yovany Vaughan MD     CC  Patient Care Team:  Ankit Pop MD as PCP - General (Family Practice)  YOVANY VAUGHAN

## 2020-02-20 ENCOUNTER — HOSPITAL ENCOUNTER (OUTPATIENT)
Dept: ULTRASOUND IMAGING | Facility: CLINIC | Age: 75
End: 2020-02-20
Attending: SURGERY
Payer: COMMERCIAL

## 2020-02-20 ENCOUNTER — OFFICE VISIT (OUTPATIENT)
Dept: OTHER | Facility: CLINIC | Age: 75
End: 2020-02-20
Attending: SURGERY
Payer: COMMERCIAL

## 2020-02-20 ENCOUNTER — HOSPITAL ENCOUNTER (OUTPATIENT)
Dept: ULTRASOUND IMAGING | Facility: CLINIC | Age: 75
Discharge: HOME | End: 2020-02-20
Attending: SURGERY | Admitting: SURGERY
Payer: COMMERCIAL

## 2020-02-20 VITALS — SYSTOLIC BLOOD PRESSURE: 158 MMHG | DIASTOLIC BLOOD PRESSURE: 70 MMHG | HEART RATE: 90 BPM

## 2020-02-20 DIAGNOSIS — I73.9 PAD (PERIPHERAL ARTERY DISEASE) (H): ICD-10-CM

## 2020-02-20 DIAGNOSIS — I73.9 PAD (PERIPHERAL ARTERY DISEASE) (H): Primary | ICD-10-CM

## 2020-02-20 PROCEDURE — 99214 OFFICE O/P EST MOD 30 MIN: CPT | Mod: ZP | Performed by: SURGERY

## 2020-02-20 PROCEDURE — G0463 HOSPITAL OUTPT CLINIC VISIT: HCPCS

## 2020-02-20 PROCEDURE — 93924 LWR XTR VASC STDY BILAT: CPT

## 2020-02-20 PROCEDURE — 93926 LOWER EXTREMITY STUDY: CPT | Mod: LT

## 2020-02-20 NOTE — NURSING NOTE
"Alberto Dorsey is a 74 year old male who presents for:  Chief Complaint   Patient presents with     RECHECK     L LE & TONYA w/ ex (9:00 [facility]; 10:15 WRO) History of left common femoral endarterectomy and patch, left popliteal angioplasty on 11/6/19; 3 month follow up to 11/21/19 appointment with Dr. Aisha Pembertons:    Vitals:    02/20/20 0940   BP: (!) 158/70   BP Location: Right arm   Patient Position: Chair   Cuff Size: Adult Regular   Pulse: 90       BMI:  Estimated body mass index is 21.93 kg/m  as calculated from the following:    Height as of 11/6/19: 6' 1\" (1.854 m).    Weight as of 11/7/19: 166 lb 3.6 oz (75.4 kg).    Pain Score:  Data Unavailable        Kecia Perez MA    "

## 2020-12-17 ENCOUNTER — TELEPHONE (OUTPATIENT)
Dept: OTHER | Facility: CLINIC | Age: 75
End: 2020-12-17

## 2020-12-17 DIAGNOSIS — I73.9 PAD (PERIPHERAL ARTERY DISEASE) (H): Primary | ICD-10-CM

## 2020-12-17 NOTE — TELEPHONE ENCOUNTER
LOV with Dr. Romero was on 2/20/20 with plan for 1 year for a follow-up duplex of his left leg arteries and TONYA with exercise.  Ultrasounds ordered. Routing to scheduling to coordinate ultrasounds and in-person follow up with Dr. Leonard in February.    Phuong COLEYN, RN    [facility]  Vascular [facility]  Office: [phone]  Fax: [phone]

## 2020-12-17 NOTE — TELEPHONE ENCOUNTER
"Patient left  stating he would like to get scheduled for \"blood flow test\".     No orders on file, called patient to let him know will send message to nurse and call patient back when instructions/information is given. Patient agree to plan.       Cecilia PRASAD  "

## 2021-02-15 ENCOUNTER — HOSPITAL ENCOUNTER (OUTPATIENT)
Dept: ULTRASOUND IMAGING | Facility: CLINIC | Age: 76
End: 2021-02-15
Attending: SURGERY
Payer: COMMERCIAL

## 2021-02-15 DIAGNOSIS — I73.9 PAD (PERIPHERAL ARTERY DISEASE) (H): ICD-10-CM

## 2021-02-15 PROCEDURE — 93924 LWR XTR VASC STDY BILAT: CPT

## 2021-02-15 PROCEDURE — 93926 LOWER EXTREMITY STUDY: CPT | Mod: LT

## 2021-02-23 NOTE — PROGRESS NOTES
[place] VASCULAR [facility]    Alberto Dorsey returns for vascular follow-up.  This 75-year-old independent gentleman had undergone a left common femoral/proximal SFA endarterectomy with bovine patch along with an intraoperative SFA angioplasty on 11/6/2019.  He did well following surgery with complete resolution of his claudication.  Last seen on 2/20/2020.  +3 palpable DP pulses bilaterally and a widely patent endarterectomy site.  SFA measured 3.1 mm with good flow.  Slight decrease in TONYA with exercise from 0.91 to 0.77 on the right and 1.06 to 0.99 on the left.    PMH: Medications: Lisinopril, Norvasc, Apresoline, Plavix,  Aspirin            Medical: Hypertension                          Hyperlipidemia on statin                          PAD                          Asymptomatic right carotid bruit  History of smoking but down to 5 cigarettes daily.    Exam: Alert and appropriate.  Normal affect.  Glasses.             Walks with no difficulty.              Blood pressure 155/75.  Pulse 92 regular              HEENT= soft right carotid bruit.  No left bruit.  No masses.              Chest =clear to auscultation              Cardiovascular= regular rate with no murmur              Extremities= normal sensation.  No edema.                    +3 palpable DP pulses bilaterally                     Onychomycotic nail changes                     Right mild first metatarsal callus      2/15/2021 TONYA: Right 0.92 decreasing to 0.80 with exercise.  Left 0.89 decreasing to 0.78.                   Left arterial duplex revealed no evidence of recurrent stenosis at the common femoral/superficial femoral or distal SFA angioplasty site.      Impression: #1.  Patent left femoral enterectomy and angioplasty.  Slight decreased TONYA with exercise but no clinical symptoms.  No specific follow-up arterial testing unless patient should redevelop symptoms.                       #2.  Asymptomatic right carotid bruit.  Ultrasound  6/12/2019 revealed very mild right carotid bifurcation disease a less than 30% left carotid stenosis.  Would recommend follow-up in 1 year.                       #3.  Aware the importance of good risk factor control.  Knows he should quit smoking.  No recent LDL in my records on statin.    25 minutes with patient today reviewing old records and present testing.       Yovany Vaughan MD    CC  Patient Care Team:  Ankit Pop MD as PCP - General (Family Practice)  Yovany Vaughan as Assigned Heart and Vascular Provider  YOVANY VAUGHAN

## 2021-02-25 ENCOUNTER — OFFICE VISIT (OUTPATIENT)
Dept: OTHER | Facility: CLINIC | Age: 76
End: 2021-02-25
Attending: SURGERY
Payer: COMMERCIAL

## 2021-02-25 VITALS — DIASTOLIC BLOOD PRESSURE: 75 MMHG | TEMPERATURE: 98.2 F | SYSTOLIC BLOOD PRESSURE: 155 MMHG | HEART RATE: 92 BPM

## 2021-02-25 DIAGNOSIS — E78.5 HYPERLIPIDEMIA LDL GOAL <70: ICD-10-CM

## 2021-02-25 DIAGNOSIS — I65.23 ASYMPTOMATIC BILATERAL CAROTID ARTERY STENOSIS: ICD-10-CM

## 2021-02-25 DIAGNOSIS — I73.9 PAD (PERIPHERAL ARTERY DISEASE) (H): Primary | ICD-10-CM

## 2021-02-25 DIAGNOSIS — F17.200 SMOKING: ICD-10-CM

## 2021-02-25 PROCEDURE — 99213 OFFICE O/P EST LOW 20 MIN: CPT | Performed by: SURGERY

## 2021-02-25 PROCEDURE — G0463 HOSPITAL OUTPT CLINIC VISIT: HCPCS

## 2021-02-25 NOTE — PROGRESS NOTES
"Albetro Dorsey is a 75 year old male who presents for:  Chief Complaint   Patient presents with     RECHECK      US on 2/15/21; History of left common femoral endarterectomy and patch, left popliteal angioplasty on 11/6/19; 1 year follow up to 2/20/20 appointment with Dr. Aisha Lewis:    Vitals:    02/25/21 0905   BP: (!) 155/75   BP Location: Left arm   Patient Position: Chair   Cuff Size: Adult Regular   Pulse: 92   Temp: 98.2  F (36.8  C)   TempSrc: Temporal       BMI:  Estimated body mass index is 21.93 kg/m  as calculated from the following:    Height as of 11/6/19: 6' 1\" (1.854 m).    Weight as of 11/7/19: 166 lb 3.6 oz (75.4 kg).    Pain Score:  Data Unavailable        Kecia Perez MA    "

## 2021-11-15 ENCOUNTER — TELEPHONE (OUTPATIENT)
Dept: OTHER | Facility: CLINIC | Age: 76
End: 2021-11-15
Payer: COMMERCIAL

## 2021-11-15 DIAGNOSIS — I73.9 PAD (PERIPHERAL ARTERY DISEASE) (H): Primary | ICD-10-CM

## 2021-11-15 DIAGNOSIS — Z95.828 S/P INSERTION OF ILIAC ARTERY STENT: ICD-10-CM

## 2021-11-15 NOTE — TELEPHONE ENCOUNTER
"LOV 2/25/21:  History of left common femoral endarterectomy and patch, left popliteal angioplasty on 11/6/19; bilateral common iliac stents       Called patient to learn about his symptoms.  Patient states the toe on his left side bothers him at night when he sleeps.  His right side has tightness in the calf, not cramping up, but is uncomfortable -  \"there is a stent on that side.\"    Hx:  Bilateral common iliac stents. 6/2019.    Routing to scheduling to arrange for:      Bilateral arterial US    TONYA with exercise and toe pressures    IVC/Iliac complete US    Follow up with Dr. Leonard (time has been held 11/18 at 11:00).    Georgia Odell RN BSN  [facility]  [phone]                        "

## 2021-11-15 NOTE — TELEPHONE ENCOUNTER
Did let pt know he would need to fast 4 hours prior to US and he was fine with fasting w/his 1st appt @ 1:30

## 2021-11-15 NOTE — TELEPHONE ENCOUNTER
Called Alberto to inform him that he is not due for appointment until 2/2022.    Alberto states he is having issues and would like appointment:    Left little toe is acting up and that is what happened last time.     Mary MOORE   Routing [facility] Surgical Triage

## 2021-11-15 NOTE — TELEPHONE ENCOUNTER
[facility]    Who is the name of the provider?:  Aisha       What is the location you see this provider at?: [place]    Reason for call:  Please call to schedule testing and office visit.      Can we leave a detailed message on this number?  YES

## 2021-11-16 NOTE — PROGRESS NOTES
[facility]    Alberto Dorsey returns for vascular follow-up.  Had undergone a left common femoral/proximal SFA endarterectomy with bovine patch along with intraoperative angioplasty of the SFA on 11/6/2019.  Has done well following surgery with relatively normal ABIs on 2/25/2021 with +3 DP pulses bilaterally.  SFA measured 3.1 mm in diameter.    PMH: Medications: Lisinopril, Norvasc, hydralazine, aspirin, Plavix     Medical: Hypertension    PAD     Asymptomatic right carotid bruit with prior ultrasounds less than 30% on the left    and minimal on the right    Continues to smoke 1/3 pack cigarettes daily    ROS: Doing very well.  No claudication symptoms at all.      Exam: Alert and appropriate.  Normal affect.   Blood pressure 171/77 left arm.  Pulse 96   Chest= clear   Cardiovascular= regular rate   Extremities= no edema, normal sensation, no ulcers      +3 palpable DP pulses bilaterally.    Strong biphasic Doppler signals in right and left DPs      Abdominal duplex reveals both common iliac artery stents are patent with minimal diameter of 5.2 mm on the right and 5.2 mm in the left with the stents minimal diameter 5.8 mm on the right and 7.6 mm in the left    Left leg duplex reveals a widely patent common femoral endarterectomy with patch.  Left mid SFA measured 3.3 mm and popliteal artery                  IMPRESSION: #1.  Patent bilateral common iliac artery stents.  Some narrowing of the left SFA and popliteal artery but excellent distal pulses bilaterally and no claudication symptoms.  Good risk factor control though should quit smoking.  Recommend follow-up duplex in 1 year.     #2.  Hypertension on exam today due to anxiety of testing.  Reports at home his blood pressure is consistently less than 140 systolic    However, patient's insurance is now [employer] and [employer] is not in their system.  We will call him in 1 year and arrange duplex follow-up in a institution that is covered by his  insurance.    Chavo Leonard MD    CC: Dr Ankit Pop

## 2021-11-17 ENCOUNTER — HOSPITAL ENCOUNTER (OUTPATIENT)
Dept: ULTRASOUND IMAGING | Facility: CLINIC | Age: 76
End: 2021-11-17
Attending: SURGERY
Payer: COMMERCIAL

## 2021-11-17 DIAGNOSIS — I73.9 PAD (PERIPHERAL ARTERY DISEASE) (H): ICD-10-CM

## 2021-11-17 DIAGNOSIS — Z95.828 S/P INSERTION OF ILIAC ARTERY STENT: ICD-10-CM

## 2021-11-17 PROCEDURE — 93924 LWR XTR VASC STDY BILAT: CPT

## 2021-11-17 PROCEDURE — 93978 VASCULAR STUDY: CPT

## 2021-11-17 PROCEDURE — 93926 LOWER EXTREMITY STUDY: CPT | Mod: LT

## 2021-11-18 ENCOUNTER — OFFICE VISIT (OUTPATIENT)
Dept: OTHER | Facility: CLINIC | Age: 76
End: 2021-11-18
Attending: SURGERY
Payer: COMMERCIAL

## 2021-11-18 VITALS — HEART RATE: 96 BPM | SYSTOLIC BLOOD PRESSURE: 171 MMHG | DIASTOLIC BLOOD PRESSURE: 77 MMHG

## 2021-11-18 DIAGNOSIS — I73.9 PAD (PERIPHERAL ARTERY DISEASE) (H): Primary | ICD-10-CM

## 2021-11-18 PROCEDURE — G0463 HOSPITAL OUTPT CLINIC VISIT: HCPCS

## 2021-11-18 PROCEDURE — 99213 OFFICE O/P EST LOW 20 MIN: CPT | Performed by: SURGERY

## 2021-11-18 NOTE — PROGRESS NOTES
[facility]        Patient is here for a  follow up.     Pt is currently taking Aspirin and Plavix.    BP (!) 171/77 (BP Location: Left arm, Patient Position: Chair, Cuff Size: Adult Regular)   Pulse 96     The provider has been notified that the patient has no concerns.     Questions patient would like addressed today are: N/A.    Refills are needed: N/A    Has homecare services and agency name:  Krista Perez MA

## 2021-11-23 DIAGNOSIS — I73.9 PAD (PERIPHERAL ARTERY DISEASE) (H): Primary | ICD-10-CM

## 2021-11-23 DIAGNOSIS — Z95.828 S/P INSERTION OF ILIAC ARTERY STENT: ICD-10-CM

## 2025-06-18 ENCOUNTER — TELEPHONE (OUTPATIENT)
Dept: OTHER | Facility: CLINIC | Age: 80
End: 2025-06-18
Payer: COMMERCIAL

## 2025-06-18 NOTE — TELEPHONE ENCOUNTER
Scheduling, please confirm pt's new insurance and update it on EPIC. Currently showing [employer] - which is [facility] does not accept.     If pt has an insurance that [facility] accepts, please route back to [facility] triage to start the referral process. Thank you!    Francheska Lang RN  [facility]  Vascular [facility]  Office: [phone]  Fax: [phone]

## 2025-06-18 NOTE — TELEPHONE ENCOUNTER
[facility]    Who is the name of the provider?:  NONE   What is the location you see this provider at/preferred location?: [place]  Person calling / Facility: Alberto Dorsey  Phone number:  [phone] (home)   Nurse call back needed:  Scheduling      Reason for call:  Patient previously saw Dr. Leonard in 2021 - patient stopped getting care at [facility] due to insurance coverage. Patient states he had an angiogram done at [facility] 6/16/25 - patient states he is very unhappy with the work done by the surgeons at [facility]. Patient recently discovered his insurance is now in network with [facility] and he would like to make an appointment with a vascular surgeon here, preferably Dr. Leonard, to continue his care.         6/18/2025, 1:08 PM

## 2025-06-19 NOTE — TELEPHONE ENCOUNTER
Spoke to pt about referral that it might be a month before he can be seen, recommended to follow-up with the vascular surgeon that did his angiogram. However, patient states he is very unhappy with the work done by the surgeons at [facility] and wants to be seen with [place]. Pt understands that it might be a month before he gets to be seen.    *images are pushed completed by GARRETT Pritchard Do, RN  [facility]  Vascular [facility]  Office: [phone]  Fax: [phone]

## 2025-06-19 NOTE — TELEPHONE ENCOUNTER
Patient came to clinic, received a call yesterday, 6/18/25, but could not understand the caller.  Thought it might be to schedule an appointment with Dr. Leonard.  Came to clinic to schedule an appointment.      Writer scanned in his current [facility] insurance card, [place], and general consent form.  Also printed a Release of Protected Health Information form to have records sent from [facility] to [employer].  Patient's insurance changed from [employer] to [employer] as of 01/01/2025.

## (undated) DEVICE — NDL BLUNT 19GA 1.5"

## (undated) DEVICE — CATH ANGIO ANG GLIDE 5FRX100CM CG508

## (undated) DEVICE — DEVICE MULTI TORQUE TD01

## (undated) DEVICE — DRAPE C-ARM 60X42" 1013

## (undated) DEVICE — GLOVE PROTEXIS W/NEU-THERA 7.5  2D73TE75

## (undated) DEVICE — SU SILK 3-0 TIE 24" SA74H

## (undated) DEVICE — RAD RX ISOVUE 300 (50ML) 61% IOPAMIDOL CHARGE PER ML

## (undated) DEVICE — SU SILK 2-0 TIE 24" SA75H

## (undated) DEVICE — PACK SPECIAL PROCEDURE CUSTOM

## (undated) DEVICE — SU VICRYL 2-0 CT-1 27" J339H

## (undated) DEVICE — DECANTER BAG 2002S

## (undated) DEVICE — SYR 10ML FINGER CONTROL W/O NDL 309695

## (undated) DEVICE — SUCTION CANISTER MEDIVAC LINER 3000ML W/LID 65651-530

## (undated) DEVICE — NDL 19GA 1.5"

## (undated) DEVICE — SOL NACL 0.9% IRRIG 1000ML BOTTLE 2F7124

## (undated) DEVICE — SHEATH INTRO 6.5FR 11CM

## (undated) DEVICE — RAD INFLATOR BASIC COMPAK  IN4130

## (undated) DEVICE — LINEN TOWEL PACK X5 5464

## (undated) DEVICE — LINEN LEG ROLL 5489

## (undated) DEVICE — ESU GROUND PAD UNIVERSAL W/O CORD

## (undated) DEVICE — DRSG STERI STRIP 1/2X4" R1547

## (undated) DEVICE — CLIP ETHICON LIGACLIP SM BLUE LT100

## (undated) DEVICE — GUIDEWIRE TERUMO .035X180 ANG GR3508

## (undated) DEVICE — SURGICEL HEMOSTAT 2X3" 1953

## (undated) DEVICE — NDL 22GA 1.5"

## (undated) DEVICE — PREP CHLORAPREP 26ML TINTED ORANGE  260815

## (undated) DEVICE — BLADE KNIFE BEAVER MINI BEAVER6400

## (undated) DEVICE — DECANTER VIAL 2006S

## (undated) DEVICE — SU PROLENE 6-0 C-1DA 30" 8706H

## (undated) DEVICE — WIRE GLIDE 0.035"X150CM VASC GR3506

## (undated) DEVICE — SU MONOCRYL 4-0 PS-2 18" UND Y496G

## (undated) DEVICE — SOL WATER IRRIG 1000ML BOTTLE 2F7114

## (undated) DEVICE — SU VICRYL 3-0 SH 27" J316H

## (undated) DEVICE — PACK VASCULAR SCV15VAFSB

## (undated) DEVICE — INTRO SHEATH 6FRX10CM PINNACLE MARKER RSB612

## (undated) RX ORDER — FENTANYL CITRATE 50 UG/ML
INJECTION, SOLUTION INTRAMUSCULAR; INTRAVENOUS
Status: DISPENSED
Start: 2019-11-06

## (undated) RX ORDER — CEFAZOLIN SODIUM 2 G/100ML
INJECTION, SOLUTION INTRAVENOUS
Status: DISPENSED
Start: 2019-11-06

## (undated) RX ORDER — HEPARIN SODIUM 1000 [USP'U]/ML
INJECTION, SOLUTION INTRAVENOUS; SUBCUTANEOUS
Status: DISPENSED
Start: 2019-11-06

## (undated) RX ORDER — ONDANSETRON 2 MG/ML
INJECTION INTRAMUSCULAR; INTRAVENOUS
Status: DISPENSED
Start: 2019-11-06

## (undated) RX ORDER — DEXAMETHASONE SODIUM PHOSPHATE 4 MG/ML
INJECTION, SOLUTION INTRA-ARTICULAR; INTRALESIONAL; INTRAMUSCULAR; INTRAVENOUS; SOFT TISSUE
Status: DISPENSED
Start: 2019-11-06

## (undated) RX ORDER — NEOSTIGMINE METHYLSULFATE 1 MG/ML
VIAL (ML) INJECTION
Status: DISPENSED
Start: 2019-11-06

## (undated) RX ORDER — LIDOCAINE HYDROCHLORIDE 20 MG/ML
INJECTION, SOLUTION EPIDURAL; INFILTRATION; INTRACAUDAL; PERINEURAL
Status: DISPENSED
Start: 2019-11-06

## (undated) RX ORDER — BUPIVACAINE HYDROCHLORIDE 5 MG/ML
INJECTION, SOLUTION EPIDURAL; INTRACAUDAL
Status: DISPENSED
Start: 2019-11-06

## (undated) RX ORDER — CEFAZOLIN SODIUM 1 G/3ML
INJECTION, POWDER, FOR SOLUTION INTRAMUSCULAR; INTRAVENOUS
Status: DISPENSED
Start: 2019-11-06

## (undated) RX ORDER — HYDROMORPHONE HYDROCHLORIDE 1 MG/ML
INJECTION, SOLUTION INTRAMUSCULAR; INTRAVENOUS; SUBCUTANEOUS
Status: DISPENSED
Start: 2019-11-06

## (undated) RX ORDER — GLYCOPYRROLATE 0.2 MG/ML
INJECTION, SOLUTION INTRAMUSCULAR; INTRAVENOUS
Status: DISPENSED
Start: 2019-11-06